# Patient Record
Sex: MALE | Race: WHITE | NOT HISPANIC OR LATINO | URBAN - METROPOLITAN AREA
[De-identification: names, ages, dates, MRNs, and addresses within clinical notes are randomized per-mention and may not be internally consistent; named-entity substitution may affect disease eponyms.]

---

## 2019-06-27 ENCOUNTER — HOSPITAL ENCOUNTER (OUTPATIENT)
Dept: RADIOLOGY | Facility: MEDICAL CENTER | Age: 64
End: 2019-06-27

## 2019-07-01 ENCOUNTER — HOSPITAL ENCOUNTER (OUTPATIENT)
Dept: RADIATION ONCOLOGY | Facility: MEDICAL CENTER | Age: 64
End: 2019-07-31
Attending: RADIOLOGY
Payer: COMMERCIAL

## 2019-07-01 VITALS
HEIGHT: 71 IN | DIASTOLIC BLOOD PRESSURE: 78 MMHG | OXYGEN SATURATION: 97 % | HEART RATE: 71 BPM | SYSTOLIC BLOOD PRESSURE: 122 MMHG | BODY MASS INDEX: 22.68 KG/M2 | WEIGHT: 162 LBS

## 2019-07-01 PROCEDURE — 99205 OFFICE O/P NEW HI 60 MIN: CPT | Performed by: RADIOLOGY

## 2019-07-01 PROCEDURE — 99214 OFFICE O/P EST MOD 30 MIN: CPT | Performed by: RADIOLOGY

## 2019-07-01 RX ORDER — TAMSULOSIN HYDROCHLORIDE 0.4 MG/1
CAPSULE ORAL
Refills: 1 | COMMUNITY
Start: 2019-05-08 | End: 2022-09-19

## 2019-07-01 RX ORDER — ASPIRIN 81 MG/1
81 TABLET, CHEWABLE ORAL DAILY
COMMUNITY
End: 2022-09-19

## 2019-07-01 ASSESSMENT — PAIN SCALES - GENERAL: PAINLEVEL: NO PAIN

## 2019-07-01 NOTE — CONSULTS
RADIATION ONCOLOGY CONSULT    DATE OF SERVICE: 7/1/2019    IDENTIFICATION:   Very high risk adenocarcinoma of the prostate, PSA 12.03, [place] score 5+4 = 9, total of 5 of 12 core biopsies involved, 1 core biopsy with [place] 5+4 = 9 1 core biopsy with [place] 4+3 equal 7 1 core biopsy with [place] 3+4 equal 7 and 2 core biopsies with Raymond 3+3 equal 6 clinical T1c    HISTORY OF PRESENT ILLNESS: I had the pleasure of seeing Mr. Major today in consultation at the request of Dr. Acevedo for his prostate cancer.  Patient is an otherwise healthy 63-year-old gentleman who had normal PSAs in the 3-4 range.  He states there had been a gap of at least an additional year from his most recent PSA.  When his PSA was checked it had risen substantially from his baseline to 10.12 and on recheck had stayed elevated at 12.03.  Transrectal ultrasound with biopsy was recommended.  Before the biopsy no palpable areas of nodularity were identified.  Biopsy confirmed 5 total areas of 12 with malignancy.  The highest Raymond area in the left mid being [place] 4+5 = 9.  The left apex had [place] 3+4 equal 7 in the left base had Raymond 4+3 equal 7.  The right mid lateral and right base lateral had Raymond 3+3 equal 6 disease.  Patient presents today discussed his options for high risk adenocarcinoma the prostate.  He has an upcoming appointment in [place] with urologic surgery.    PAST MEDICAL HISTORY:   Past Medical History:   Diagnosis Date   • Cancer (HCC)    • Diverticulosis    • Elevated PSA    • History of adenomatous polyp of colon    • Prostate cancer (HCC)    • Shoulder pain        PAST SURGICAL HISTORY:  History reviewed. No pertinent surgical history.    CURRENT MEDICATIONS:  Current Outpatient Prescriptions   Medication Sig Dispense Refill   • tamsulosin (FLOMAX) 0.4 MG capsule   1   • aspirin (ASA) 81 MG Chew Tab chewable tablet Take 81 mg by mouth every day.       No current facility-administered medications for this  "encounter.        ALLERGIES:    Patient has no known allergies.    FAMILY HISTORY:    History reviewed. No pertinent family history.    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  All are negative with relationship to this diagnosis with the exception of:  Patient has an IPSS score of 1-0, his score of 1 comes from a single trip at nighttime for nocturia, he does not have any erectile dysfunction, he has normal bowel habits, he denies any bony pain or neurologic symptoms    PHYSICAL EXAM:    Vitals:    07/01/19 1410   BP: 122/78   Pulse: 71   SpO2: 97%   Weight: 73.5 kg (162 lb)   Height: 1.803 m (5' 11\")   Pain Score: No pain      0= Fully active, able to carry on all pre-disease performance without restriction.    PAIN:  0  GENERAL: No apparent distress.  HEENT:  Pupils are equal, round, and reactive to light.  Extraocular muscles   are intact. Sclerae nonicteric.  Conjunctivae pink.  Oral cavity, tongue   protrudes midline.   NECK:  Supple without evidence of thyromegaly.  NODES:  No peripheral adenopathy of the neck, supraclavicular fossa or axillae   bilaterally.  LUNGS:  Clear to ascultation bilaterally   HEART:  Regular rate and rhythm.  No murmur appreciated  ABDOMEN:  Soft. No evidence of hepatosplenomegaly.  Positive bowel sounds.  RECTAL: No suspicious nodules or lesions  EXTREMITIES:  Without Edema.  NEUROLOGIC:  Cranial nerves II through XII were intact. Normal stance and gait motor and sensory grossly within normal limits    IMPRESSION:   Very high risk adenocarcinoma of the prostate, PSA 12.03, [place] score 5+4 = " 9, total of 5 of 12 core biopsies involved, 1 core biopsy with Raymond 5+4 = 9 1 core biopsy with Raymond 4+3 equal 7 1 core biopsy with Raymond 3+4 equal 7 and 2 core biopsies with Raymond 3+3 equal 6 clinical T1c     RECOMMENDATIONS:   I discussed the diagnosis, prognosis, and treatment options over a 1 hr 5 min time period, 95% of that time dedicated to ongoing treatment management.  I discussed and distinguish for the patient the different risk stratification profiles for prostate cancer.  With [place] 9 disease although only in 1 specimen and PSA over 10 he qualifies is very high risk.  Therefore we went over the NCCN guidelines for suitable management strategies.  We discussed combination androgen deprivation therapy for at least 18 months in combination with external beam radiotherapy or external beam radiotherapy and brachytherapy seed implantation.  We discussed the emerging use of surgery in combination with adjuvant radiotherapy and androgen deprivation therapy.  Currently we do not have category 1 status for this approach but is a national trend and management.  We went over the rationale for each of these approaches.  We went over the philosophies, oncologic principles, and rationales and compared this with side effects and expectations.  Patient has his appointment in [place] next week to further discuss the surgical component of management.  After that visit he will contact us with his final treatment approach.  If he wishes to pursue seed implantation as a part of his combined therapy I will have him schedule appointment with Dr. Quiroz to further discuss this component of the management.    We discussed the risks, benefits and side effects of treatment and the patient is amenable to treatment.  If patient has any questions or concerns, he should feel free to contact me.    Thank you for the opportunity to participate in his care.  If any questions or comments, please do not hesitate in  calling.

## 2019-07-01 NOTE — NON-PROVIDER
"Patient was seen today in clinic with Dr. Smith for Follow up.  Vitals signs and weight were obtained and pain assessment was completed.  Allergies and medications were reviewed with the patient.  Review of systems completed.     Vitals/Pain:  Vitals:    07/01/19 1410   BP: 122/78   Pulse: 71   SpO2: 97%   Weight: 73.5 kg (162 lb)   Height: 1.803 m (5' 11\")   Pain Score: No pain        Allergies:   Patient has no known allergies.    Current Medications:  Current Outpatient Prescriptions   Medication Sig Dispense Refill   • tamsulosin (FLOMAX) 0.4 MG capsule   1   • aspirin (ASA) 81 MG Chew Tab chewable tablet Take 81 mg by mouth every day.       No current facility-administered medications for this encounter.          PCP:  Gurpreet Avalos, Med Ass't    "

## 2019-07-16 ENCOUNTER — PATIENT OUTREACH (OUTPATIENT)
Dept: OTHER | Facility: MEDICAL CENTER | Age: 64
End: 2019-07-16

## 2019-07-16 NOTE — PROGRESS NOTES
Telephone call to pt to discuss navigation.  Introduced myself and the NN role and provided contact information.  Pt stated he has decided to [facility] [place] [facility] and will not be having treatment at [facility].

## 2021-03-03 DIAGNOSIS — Z23 NEED FOR VACCINATION: ICD-10-CM

## 2022-09-15 PROBLEM — C61 PROSTATE CANCER (HCC): Status: ACTIVE | Noted: 2022-09-15

## 2022-09-19 ENCOUNTER — HOSPITAL ENCOUNTER (OUTPATIENT)
Dept: RADIATION ONCOLOGY | Facility: MEDICAL CENTER | Age: 67
End: 2022-09-30
Attending: RADIOLOGY
Payer: COMMERCIAL

## 2022-09-19 VITALS
DIASTOLIC BLOOD PRESSURE: 77 MMHG | HEIGHT: 71 IN | HEART RATE: 66 BPM | OXYGEN SATURATION: 96 % | WEIGHT: 154 LBS | BODY MASS INDEX: 21.56 KG/M2 | SYSTOLIC BLOOD PRESSURE: 130 MMHG

## 2022-09-19 DIAGNOSIS — C61 PROSTATE CANCER (HCC): ICD-10-CM

## 2022-09-19 PROCEDURE — 99214 OFFICE O/P EST MOD 30 MIN: CPT | Performed by: RADIOLOGY

## 2022-09-19 PROCEDURE — 99205 OFFICE O/P NEW HI 60 MIN: CPT | Performed by: RADIOLOGY

## 2022-09-19 ASSESSMENT — PAIN SCALES - GENERAL: PAINLEVEL: NO PAIN

## 2022-09-19 NOTE — CT SIMULATION
PATIENT NAME Surya Major   PRIMARY PHYSICIAN Pcp Pt States None [id number]   REFERRING PHYSICIAN Curtis Helm M.D. 1955     Prostate cancer (HCC)  Staging form: Prostate, AJCC 8th Edition  - Pathologic: Stage IIB (pT2, pN0, cM0, PSA: 12, Grade Group: 2) - Signed by Jarret Smith M.D. on 9/15/2022  Prostate specific antigen (PSA) range: 10 to 19  Raymond score: 7  Histologic grading system: 5 grade system         Treatment Planning CT Simulation        Order Questions       Question Answer Comment    Is this for a new course of treatment? Yes     Is this an Addendum? No     Simulation Status Initial     Treatment Technique IMRT     Other Technique(s) IMRT     Treatment Pattern/Frequency Daily     Concurrent Chemotherapy No     CT Technique 3D     Slice Thickness 3mm     Scan Extent Abdomen      Pelvis     Bowel Preparation Yes     Treatment Device(s) Vac Rai     Patient Attire Gown     Patient Position Supine     Patient Orientation Head First     Arm Position On Chest     Bladder Full     Treatment Machine TB2 or TB3     Treatment Image Guidance CBCT     Frequency (CBCT) Daily     Image Guidance Match PTV - Soft Tissue Prostate    Other Orders Weekly Physics Check                   Comments       Prostate bed with nodes

## 2022-09-19 NOTE — CONSULTS
RADIATION ONCOLOGY CONSULT    DATE OF SERVICE: 9/19/2022    IDENTIFICATION:   Adenocarcinoma the prostate, presenting PSA 12, post prostatectomy PSA 0.01, current PSA 0.15, Raymond score on final surgical specimen 3+4 equal 7, pathologic T2 with positive surgical margin, status post radical prostatectomy with lymph node dissection in August 2019.      HISTORY OF PRESENT ILLNESS: I had the pleasure of seeing Mr. Major today in consultation at the request of Dr. Acevedo for his rising PSA.  I originally saw the patient back in 2019 when he was making his initial treatment decisions.  Ultimately he elected to proceed with a radical prostatectomy and lymph node dissection.  This confirmed an adenocarcinoma the prostate with Raymond 3+4 equal 7 disease.  He did not have any seminal vesicle or extraprostatic extension but did have a positive surgical margin.  His lymph nodes were surgically negative.  He had a PSA rene undetectable at 0.01.  It had remained undetectable until recently where he has had a slow steady rise to 0.15.  He has been sent today to further discuss the role of radiation in the post prostatectomy setting.  He has regained all urinary and erectile control.  He does not have any areas of bony tenderness or deformity.    PAST MEDICAL HISTORY:   Past Medical History:   Diagnosis Date    Atony of bladder     Cancer (HCC)     Diverticulosis     Elevated PSA     History of adenomatous polyp of colon     Prostate cancer (HCC)     Shoulder pain        PAST SURGICAL HISTORY:  Past Surgical History:   Procedure Laterality Date    PROSTATECTOMY, RADICAL RETRO      2019       CURRENT MEDICATIONS:  Current Outpatient Medications   Medication Sig Dispense Refill    tamsulosin (FLOMAX) 0.4 MG capsule   1    aspirin (ASA) 81 MG Chew Tab chewable tablet Take 81 mg by mouth every day.       No current facility-administered medications for this encounter.       ALLERGIES:    Patient has no known allergies.    FAMILY  "HISTORY:    Family History   Problem Relation Age of Onset    Cancer Father         Colom and prostate       SOCIAL HISTORY:    Social History     Tobacco Use    Smoking status: Former    Smokeless tobacco: Never    Tobacco comments:     quit in 1990   Vaping Use    Vaping Use: Never used   Substance Use Topics    Alcohol use: Yes     Comment: 3 drinks per day    Drug use: No       Patient is working as a   .  Lives with: Spouse.    REVIEW OF SYSTEMS:  A complete review of systems was completed in patient's chart on 9/19/2022.  All are negative with relationship to this diagnosis with the exception of:  Patient has regained erectile ability, he has excellent urinary control but on occasion will have leakage if he \"waits too long\"    PHYSICAL EXAM:    Vitals:    09/19/22 0823   BP: 130/77   BP Location: Right arm   Patient Position: Sitting   BP Cuff Size: Adult   Pulse: 66   SpO2: 96%   Weight: 69.9 kg (154 lb)   Height: 1.803 m (5' 11\")   Pain Score: No pain      0= Fully active, able to carry on all pre-disease performance without restriction.      GENERAL: No apparent distress.  HEENT:  Pupils are equal, round, and reactive to light.  Extraocular muscles   are intact. Sclerae nonicteric.  Conjunctivae pink.  Oral cavity, tongue   protrudes midline.   NECK:  Supple without evidence of thyromegaly.  NODES:  No peripheral adenopathy of the neck, supraclavicular fossa or axillae   bilaterally.  LUNGS:  Clear to ascultation bilaterally   HEART:  Regular rate and rhythm.  No murmur appreciated  ABDOMEN:  Soft. No evidence of hepatosplenomegaly.  Positive bowel sounds.  EXTREMITIES:  Without Edema.  NEUROLOGIC:  Cranial nerves II through XII were intact. Normal stance and gait motor and sensory grossly within normal limits       IPSS:  IN THE PAST MONTH:     1.  Incomplete Emptying: How often have you had the sensation of not emptying your bladder?  1 = Less that 1 in 5 times    2.  Frequency: How " often have you had to urinate less than every two hours? 0 = Not at all    3.  Intermittency: How often have you found you stopped and started again several times when you urinated? 0 = Not at all    4.  Urgency: How often have you found it difficult to postpone urination? 0 = Not at all    5.  Weak Stream: How often have you had a weak urinary stream? 0 = Not at all    6.  Straining: How often have you had to strain to start urination? 5 = Almost always    7.  Nocturia: How many times did you typically get up at night to urinate? 0 = None    TOTAL: 6 1-7 = Mild    QUALITY OF LIFE DUE TO URINARY SYMPTOMS:     If you were to spend the rest of your life with your urinary condition just the way it is now, how would you feel about that? 0 = Delighted      KRISTINA:  SEXUAL HEALTH INVENTORY FOR MEN (KRISTINA):   Over the past 6 months:   1.  How do you rate your confidence that you could get and keep an erection?  5 = Very High    2.  When you had erections with sexual stimulation, how often were your erections hard enough for penetration (entering your partner)? 5 = Almost always or always     3.  During sexual intercourse, how often were you able to maintain your erection after you had penetrated (entered) your partner? 0 = Did not attempt    4.  During sexual intercourse, how difficult was it to maintain your erection to completion of intercourse? 0= Did not attempt intercourse    5.  When you attempted sexual intercourse, how often was it satisfactory for you? 0 = Did not attempt intercourse    TOTAL: 10    The Sexual Health Inventory for Men further classifies ED severity with the following breakpoints: 8-11 = Moderate ED        IMPRESSION:    Adenocarcinoma the prostate, presenting PSA 12, post prostatectomy PSA 0.01, current PSA 0.15, [place] score on final surgical specimen 3+4 equal 7, pathologic T2 with positive surgical margin, status post radical prostatectomy with lymph node dissection in August 2019.           RECOMMENDATIONS:   I discussed the diagnosis, prognosis, and treatment options over a 1 hr 5 min time period, 95% of that time dedicated to ongoing treatment management.  Patient has had a very good handle of his presenting disease and current situation.  In addition he is well researched and understanding of the topics.  Therefore we discussed specifically the indicators for prostate bed, dorota, or distant recurrence patterns.  We went over some of the historical data with prostate bed only radiation.  Then we included more recent trials including the sporrt trial.  In this trial we get a little more insight into inclusion of pelvic field or androgen deprivation therapy.  We discussed the generic results pointing towards prostate bed, pelvic, and androgen deprivation therapy.  However given some of his features including positive margin, undetectable rene, slow rising post prostatectomy PSA with a low absolute volume of PSA consideration of need for androgen deprivation therapy or pelvic radiation deserves further discussion.  After long discussion on the topic ultimately he feels he would like to proceed with radiation at this PSA level.  He is leaning towards prostate bed and pelvic radiotherapy but omission of short course androgen deprivation therapy.  He does have some conflicts in the coming weeks and was given a simulation for October 10 at 11 AM.    We discussed the risks, benefits and side effects of treatment and the patient is amenable to treatment.  If patient has any questions or concerns, he should feel free to contact me.    Thank you for the opportunity to participate in his care.  If any questions or comments, please do not hesitate in calling.

## 2022-09-19 NOTE — PROGRESS NOTES
"Patient was seen today in clinic with Dr. Smith for consultation.  Vitals signs and weight were obtained and pain assessment was completed.  Allergies and medications were reviewed with the patient.      Vitals/Pain:  Vitals:    09/19/22 0823   BP: 130/77   BP Location: Right arm   Patient Position: Sitting   BP Cuff Size: Adult   Pulse: 66   SpO2: 96%   Weight: 69.9 kg (154 lb)   Height: 1.803 m (5' 11\")   Pain Score: No pain        Allergies:   Patient has no known allergies.    Current Medications:  Current Outpatient Medications   Medication Sig Dispense Refill    tamsulosin (FLOMAX) 0.4 MG capsule   1    aspirin (ASA) 81 MG Chew Tab chewable tablet Take 81 mg by mouth every day.       No current facility-administered medications for this encounter.         PCP:  Pcp Pt States None        Martha Mixon R.N.   "

## 2022-09-22 ENCOUNTER — PATIENT OUTREACH (OUTPATIENT)
Dept: ONCOLOGY | Facility: MEDICAL CENTER | Age: 67
End: 2022-09-22

## 2022-09-22 NOTE — PROGRESS NOTES
Call placed to patient, left message.  Sent letter and message via [facility].  Mailed letter and support service calendar.

## 2022-09-22 NOTE — LETTER
[facility]   [address]  [place] [address]  Phone: [phone] - Fax: [phone]              Surya Major  [address] [place] [address]     Date: 09/22/22      Dear Surya,    I am a Cancer Nurse Navigator, a certified oncology nurse. My role is to assess any needs you may have with education, guidance and support. I am available to you and your family through any cancer treatment at [facility].       I am available to address your needs during your journey with the following services:     Care Coordination  I can assist you in facilitating communication between your cancer care treatment team to ensure timely treatment and follow-up.  I can also assist with transition of care back to your primary care provider, or other specialist, as needed.  My goal is to bridge gaps for you throughout the course of your active treatment.       Education Services  Understanding the recommended treatment course by your physician is key. I can provide educational resources personalized to your cancer diagnosis to help you understand your diagnosis and treatment. Please let me know if you would like to receive information about your diagnosis and treatment plan.  I am here to help.     Support Services/Resource Information  [facility] we offer a full scope of support services.  I can assist you with referral information to:  · Cancer Clinical Trials & Research  · Nutrition counseling  · Support groups  · Complementary Therapies such as Mind-Body Techniques Meditation  · Patient Financial Advocates  ·   · [facility], an [employer] affiliate office, our volunteers can assist you with accessing our [employer] [employer]ing library, support services information, head coverings and comfort items  · Community and national resources, included eligibility based chase assistance and pharmaceutical access programs, if you are in need of additional  information.     [facility] offers services that include:  · Behavioral Health  · Genetic counseling & testing  · Acupuncture  · Lymphedema prevention/treatment program  · Palliative care services.       I hope you have an excellent patient experience.  Please feel free to share with me your comments regarding the care you have received- we value your feedback.    Sincerely,       Regina Echevarria R.N.  Cancer Nurse Navigator    Office: [phone]  Main:  [phone]   Email: [email or web address]

## 2022-10-03 ENCOUNTER — HOSPITAL ENCOUNTER (OUTPATIENT)
Dept: RADIATION ONCOLOGY | Facility: MEDICAL CENTER | Age: 67
End: 2022-10-31
Attending: RADIOLOGY
Payer: COMMERCIAL

## 2022-10-07 ENCOUNTER — PATIENT OUTREACH (OUTPATIENT)
Dept: ONCOLOGY | Facility: MEDICAL CENTER | Age: 67
End: 2022-10-07
Payer: COMMERCIAL

## 2022-10-07 NOTE — PROGRESS NOTES
On October 7, 2022, Oncology Social Worker Elizabeth Siu attempted telephone contact with pt.  OSW Salbador left voicemail message for pt. requesting pt. call back at earliest convenience.  OSW Salbador left contact information in voicemail message.

## 2022-10-10 ENCOUNTER — HOSPITAL ENCOUNTER (OUTPATIENT)
Dept: RADIATION ONCOLOGY | Facility: MEDICAL CENTER | Age: 67
End: 2022-10-10

## 2022-10-10 PROCEDURE — 77263 THER RADIOLOGY TX PLNG CPLX: CPT | Performed by: RADIOLOGY

## 2022-10-10 PROCEDURE — 77334 RADIATION TREATMENT AID(S): CPT | Performed by: RADIOLOGY

## 2022-10-10 PROCEDURE — 77334 RADIATION TREATMENT AID(S): CPT | Mod: 26 | Performed by: RADIOLOGY

## 2022-10-10 PROCEDURE — 77290 THER RAD SIMULAJ FIELD CPLX: CPT | Performed by: RADIOLOGY

## 2022-10-10 NOTE — RADIATION COMPLETION NOTES
DATE OF SERVICE: 10/10/2022    DIAGNOSIS:  Prostate cancer (HCC)  Staging form: Prostate, AJCC 8th Edition  - Pathologic: Stage IIB (pT2, pN0, cM0, PSA: 12, Grade Group: 2) - Signed by Jarret Smith M.D. on 9/15/2022  Prostate specific antigen (PSA) range: 10 to 19  [place] score: 7  Histologic grading system: 5 grade system       DATE OF SERVICE: 10/10/2022    TYPE OF SIMULATION: Pelvis    GOAL OF TREATMENT:   [x] Curative  [] Palliative  [] Oligometastatic    CONTRAST:    [] IV Contrast*  [] Small Bowel  [] Rectal  [] Urethral              POSITION:    [x]  Supine  [] Prone with belly board    COMPLEX:  [x] Complex Blocking   []Arcs  [] Custom Blocks  [] >3 Sites    PROCEDURE: Patient positioned on CT table in Vac-Rai immobilization device with or without belly board depending on position. CT acquired thorough the entire volume of interest.  Images reviewed and exported to treatment planning system.    I have personally reviewed the relevant data, performed the target localization, and determined all relevant factors for this patient’s simulation.    *Omnipaque 80 -100cc IVP in conjunction with 500cc NS

## 2022-10-10 NOTE — RADIATION COMPLETION NOTES
Clinical Treatment Planning Note    DATE OF SERVICE: 10/10/2022    DIAGNOSIS:  Prostate cancer (HCC)  Staging form: Prostate, AJCC 8th Edition  - Pathologic: Stage IIB (pT2, pN0, cM0, PSA: 12, Grade Group: 2) - Signed by Jarret Smith M.D. on 9/15/2022  Prostate specific antigen (PSA) range: 10 to 19  [place] score: 7  Histologic grading system: 5 grade system         IMAGING REVIEWED:  [x] CT     [] MRI     [] PET/CT     [] BONE SCAN     [] MAMMO     [] OTHER      TREATMENT INTENT:   [x] CURATIVE     [] MAINTENANCE     []  PALLIATIVE      []  SUPPORTIVE     []  PROPHYLACTIC     [] BENIGN     []  CONSOLIDATIVE      [] DEFINITIVE   []  OLOGIMETASTATIC      LINE OF TREATMENT:  [x] ADJUVANT   [] DEFINITIVE   [] NEOADJUVANT   [] RE-TREATMENT      TECHNIQUE PLANNED:  [x] IMRT   [] 3D   [] SBRT   [] SRS/SRT   [] HDR   [] ELECTRON       IMRT JUSTIFICATION:  []   An immediately adjacent area has been previously irradiated and abutting portals must be established with high precision.    []  Dose escalation is planned to deliver radiation doses in excess of those commonly utilized for similar tumors with conventional treatment.    []  The target volume is concave or convex, and the critical normal tissues are within or around that convexity or concavity.    [x]  The target volume is in close proximity to critical structures that must be protected.    [x]  The volume of interest must be covered with narrow margins to adequately protect  immediately adjacent structures.      FIELDS & BLOCKING:  [x] COMPLEX BLOCKS     []  = 3 TX AREAS     []  ARCS     []  CUSTOM SHEILD        []  SIMPLE BLOCK      CHEMOTHERAPY:  []  CONCURRENT     []  INDUCTION     [] SEQUENTIAL     []  <30 DAYS FROM XRT      NOTES:  OAR CONSTRAINTS: (GUIDELINES ONLY NOT ABSOLUTE)   Target Prescribed Coverage   PTVboost 100% of PTVboost covered by 95% (cGy) of RX Dose     PTVinitial 100% of PTVinitial covered by 95% (Gy) of RX Dose       HENNA Goal   Plan  Hot Spot Max Dose < 110%   Rectum V60Gy < 35%   Rectum V65Gy < 25%   Rectum V70Gy < 20%   Rectum V75Gy < 15%   Bladder V65Gy < 50%   Bladder V70Gy < 35%   Bladder V75Gy < 25%   Bladder V80Gy < 15%   R Femoral Head Max Dose < 50Gy   L Femoral Head Max Dose < 50Gy   individual Small Bowel Loops V15Gy < 120cc   Entire Cavity Small Bowel V45Gy < 195cc   Penile Bulb Mean Dose < 52.5Gy   *RTOG 0924, RTOG 1115, QUANTEC

## 2022-10-11 ENCOUNTER — PATIENT OUTREACH (OUTPATIENT)
Dept: ONCOLOGY | Facility: MEDICAL CENTER | Age: 67
End: 2022-10-11
Payer: COMMERCIAL

## 2022-10-11 NOTE — PROGRESS NOTES
"[facility] Intake    Diagnosis Type: Prostate   Preferred Language: English  Insurance: [facility] & [facility]   Dental Insurance:Yes; Last Appointment Date:\"about 4 months ago\"   Primary Care Provider Reviewed: yes  Last App[facility]ment Date: \"[facility]\"  Introduced Surya Major to [facility] and provided contact information on 10/11/2022 .  Current Barriers Identified Include: None at this time.  Xamplified Status:Activated, uses occasionally.   Communication Preferences: Xamplified messages; Best Contact Number: [phone]  Patient Contact's Reviewed: yes     Outbound call to pt. For [facility] intake. Pt. States he has family and [employer] as his support system. Educated pt. On [employer]/events web site, reviewed the cancer support groups calendar handout and the resource center. Provided pt. With MARY Tapia's and JUANIS Chin's contact information. Pt. States he has questions regarding medicare and would call [facility] soon, he declined a warm transfer to [facility] at this time.  Pt. Stated his wife has questions about \"cyberknife\" radiation treatment and he would send Dr. Smith a message in Xamplified.     Outcome:  No further needs at this time.          "

## 2022-10-11 NOTE — PROGRESS NOTES
Unable to meet with pt. Post SIM on 10/10/11, radiation therapist provided pt. with [facility] resource folder.

## 2022-10-14 ENCOUNTER — PATIENT OUTREACH (OUTPATIENT)
Dept: ONCOLOGY | Facility: MEDICAL CENTER | Age: 67
End: 2022-10-14
Payer: COMMERCIAL

## 2022-10-17 NOTE — PROGRESS NOTES
Spoke with Dr Smith regarding patient and wife's questions.  Information provided was patient is post surgery, so cyberknife 5 treatment is not indicated for this.  Call placed to patient and provided information discussed with Dr Smith.  Pt had wife on line as well.  Both reporting that answers their question and they understand.  Answered additional questions regarding wait time when getting treated and if additional x-ray/scans were needed, if this would  be a problem.  Pt and wife deny additional questions and report no barriers to care at this time.  Encouraged them to reach if anything comes up.

## 2022-10-18 ENCOUNTER — HOSPITAL ENCOUNTER (OUTPATIENT)
Dept: RADIATION ONCOLOGY | Facility: MEDICAL CENTER | Age: 67
End: 2022-10-18

## 2022-10-27 PROCEDURE — 77300 RADIATION THERAPY DOSE PLAN: CPT | Mod: 26 | Performed by: RADIOLOGY

## 2022-10-27 PROCEDURE — 77338 DESIGN MLC DEVICE FOR IMRT: CPT | Mod: 26 | Performed by: RADIOLOGY

## 2022-10-27 PROCEDURE — 77301 RADIOTHERAPY DOSE PLAN IMRT: CPT | Performed by: RADIOLOGY

## 2022-10-27 PROCEDURE — 77338 DESIGN MLC DEVICE FOR IMRT: CPT | Performed by: RADIOLOGY

## 2022-10-27 PROCEDURE — 77301 RADIOTHERAPY DOSE PLAN IMRT: CPT | Mod: 26 | Performed by: RADIOLOGY

## 2022-10-27 PROCEDURE — 77300 RADIATION THERAPY DOSE PLAN: CPT | Performed by: RADIOLOGY

## 2022-10-31 ENCOUNTER — HOSPITAL ENCOUNTER (OUTPATIENT)
Dept: RADIATION ONCOLOGY | Facility: MEDICAL CENTER | Age: 67
End: 2022-10-31

## 2022-10-31 LAB
CHEMOTHERAPY INFUSION START DATE: NORMAL
CHEMOTHERAPY RECORDS: 1.8
CHEMOTHERAPY RECORDS: 5040
CHEMOTHERAPY RECORDS: NORMAL
CHEMOTHERAPY RX CANCER: NORMAL
DATE 1ST CHEMO CANCER: NORMAL
RAD ONC ARIA COURSE LAST TREATMENT DATE: NORMAL
RAD ONC ARIA COURSE TREATMENT ELAPSED DAYS: NORMAL
RAD ONC ARIA REFERENCE POINT DOSAGE GIVEN TO DATE: 1.8
RAD ONC ARIA REFERENCE POINT DOSAGE GIVEN TO DATE: 1.85
RAD ONC ARIA REFERENCE POINT ID: NORMAL
RAD ONC ARIA REFERENCE POINT ID: NORMAL
RAD ONC ARIA REFERENCE POINT SESSION DOSAGE GIVEN: 1.8
RAD ONC ARIA REFERENCE POINT SESSION DOSAGE GIVEN: 1.85

## 2022-10-31 PROCEDURE — 77385 HCHG IMRT DELIVERY SIMPLE: CPT | Performed by: RADIOLOGY

## 2022-10-31 PROCEDURE — 77280 THER RAD SIMULAJ FIELD SMPL: CPT | Performed by: RADIOLOGY

## 2022-11-01 ENCOUNTER — HOSPITAL ENCOUNTER (OUTPATIENT)
Dept: RADIATION ONCOLOGY | Facility: MEDICAL CENTER | Age: 67
End: 2022-11-30
Attending: RADIOLOGY
Payer: COMMERCIAL

## 2022-11-01 ENCOUNTER — HOSPITAL ENCOUNTER (OUTPATIENT)
Dept: RADIATION ONCOLOGY | Facility: MEDICAL CENTER | Age: 67
End: 2022-11-01

## 2022-11-01 LAB
CHEMOTHERAPY INFUSION START DATE: NORMAL
CHEMOTHERAPY RECORDS: 1.8
CHEMOTHERAPY RECORDS: 5040
CHEMOTHERAPY RECORDS: NORMAL
CHEMOTHERAPY RX CANCER: NORMAL
DATE 1ST CHEMO CANCER: NORMAL
RAD ONC ARIA COURSE LAST TREATMENT DATE: NORMAL
RAD ONC ARIA COURSE TREATMENT ELAPSED DAYS: NORMAL
RAD ONC ARIA REFERENCE POINT DOSAGE GIVEN TO DATE: 3.6
RAD ONC ARIA REFERENCE POINT DOSAGE GIVEN TO DATE: 3.71
RAD ONC ARIA REFERENCE POINT ID: NORMAL
RAD ONC ARIA REFERENCE POINT ID: NORMAL
RAD ONC ARIA REFERENCE POINT SESSION DOSAGE GIVEN: 1.8
RAD ONC ARIA REFERENCE POINT SESSION DOSAGE GIVEN: 1.85

## 2022-11-01 PROCEDURE — 77385 HCHG IMRT DELIVERY SIMPLE: CPT | Performed by: RADIOLOGY

## 2022-11-01 PROCEDURE — 77014 PR CT GUIDANCE PLACEMENT RAD THERAPY FIELDS: CPT | Mod: 26 | Performed by: RADIOLOGY

## 2022-11-02 ENCOUNTER — HOSPITAL ENCOUNTER (OUTPATIENT)
Dept: RADIATION ONCOLOGY | Facility: MEDICAL CENTER | Age: 67
End: 2022-11-02
Payer: COMMERCIAL

## 2022-11-02 VITALS
HEART RATE: 62 BPM | WEIGHT: 155.87 LBS | OXYGEN SATURATION: 98 % | BODY MASS INDEX: 21.74 KG/M2 | DIASTOLIC BLOOD PRESSURE: 80 MMHG | SYSTOLIC BLOOD PRESSURE: 133 MMHG

## 2022-11-02 LAB
CHEMOTHERAPY INFUSION START DATE: NORMAL
CHEMOTHERAPY RECORDS: 1.8
CHEMOTHERAPY RECORDS: 5040
CHEMOTHERAPY RECORDS: NORMAL
CHEMOTHERAPY RX CANCER: NORMAL
DATE 1ST CHEMO CANCER: NORMAL
RAD ONC ARIA COURSE LAST TREATMENT DATE: NORMAL
RAD ONC ARIA COURSE TREATMENT ELAPSED DAYS: NORMAL
RAD ONC ARIA REFERENCE POINT DOSAGE GIVEN TO DATE: 5.4
RAD ONC ARIA REFERENCE POINT DOSAGE GIVEN TO DATE: 5.56
RAD ONC ARIA REFERENCE POINT ID: NORMAL
RAD ONC ARIA REFERENCE POINT ID: NORMAL
RAD ONC ARIA REFERENCE POINT SESSION DOSAGE GIVEN: 1.8
RAD ONC ARIA REFERENCE POINT SESSION DOSAGE GIVEN: 1.85

## 2022-11-02 PROCEDURE — 77014 PR CT GUIDANCE PLACEMENT RAD THERAPY FIELDS: CPT | Mod: 26 | Performed by: RADIOLOGY

## 2022-11-02 PROCEDURE — 77385 HCHG IMRT DELIVERY SIMPLE: CPT | Performed by: RADIOLOGY

## 2022-11-02 PROCEDURE — 77336 RADIATION PHYSICS CONSULT: CPT | Performed by: RADIOLOGY

## 2022-11-02 ASSESSMENT — PAIN SCALES - GENERAL: PAINLEVEL: NO PAIN

## 2022-11-03 ENCOUNTER — HOSPITAL ENCOUNTER (OUTPATIENT)
Dept: RADIATION ONCOLOGY | Facility: MEDICAL CENTER | Age: 67
End: 2022-11-03
Payer: COMMERCIAL

## 2022-11-03 LAB
CHEMOTHERAPY INFUSION START DATE: NORMAL
CHEMOTHERAPY RECORDS: 1.8
CHEMOTHERAPY RECORDS: 5040
CHEMOTHERAPY RECORDS: NORMAL
CHEMOTHERAPY RX CANCER: NORMAL
DATE 1ST CHEMO CANCER: NORMAL
RAD ONC ARIA COURSE LAST TREATMENT DATE: NORMAL
RAD ONC ARIA COURSE TREATMENT ELAPSED DAYS: NORMAL
RAD ONC ARIA REFERENCE POINT DOSAGE GIVEN TO DATE: 7.2
RAD ONC ARIA REFERENCE POINT DOSAGE GIVEN TO DATE: 7.41
RAD ONC ARIA REFERENCE POINT ID: NORMAL
RAD ONC ARIA REFERENCE POINT ID: NORMAL
RAD ONC ARIA REFERENCE POINT SESSION DOSAGE GIVEN: 1.8
RAD ONC ARIA REFERENCE POINT SESSION DOSAGE GIVEN: 1.85

## 2022-11-03 PROCEDURE — 77385 HCHG IMRT DELIVERY SIMPLE: CPT | Performed by: RADIOLOGY

## 2022-11-03 PROCEDURE — 77014 PR CT GUIDANCE PLACEMENT RAD THERAPY FIELDS: CPT | Mod: 26 | Performed by: RADIOLOGY

## 2022-11-04 ENCOUNTER — HOSPITAL ENCOUNTER (OUTPATIENT)
Dept: RADIATION ONCOLOGY | Facility: MEDICAL CENTER | Age: 67
End: 2022-11-04
Payer: COMMERCIAL

## 2022-11-04 LAB
CHEMOTHERAPY INFUSION START DATE: NORMAL
CHEMOTHERAPY RECORDS: 1.8
CHEMOTHERAPY RECORDS: 5040
CHEMOTHERAPY RECORDS: NORMAL
CHEMOTHERAPY RX CANCER: NORMAL
DATE 1ST CHEMO CANCER: NORMAL
RAD ONC ARIA COURSE LAST TREATMENT DATE: NORMAL
RAD ONC ARIA COURSE TREATMENT ELAPSED DAYS: NORMAL
RAD ONC ARIA REFERENCE POINT DOSAGE GIVEN TO DATE: 9
RAD ONC ARIA REFERENCE POINT DOSAGE GIVEN TO DATE: 9.26
RAD ONC ARIA REFERENCE POINT ID: NORMAL
RAD ONC ARIA REFERENCE POINT ID: NORMAL
RAD ONC ARIA REFERENCE POINT SESSION DOSAGE GIVEN: 1.8
RAD ONC ARIA REFERENCE POINT SESSION DOSAGE GIVEN: 1.85

## 2022-11-04 PROCEDURE — 77427 RADIATION TX MANAGEMENT X5: CPT | Performed by: RADIOLOGY

## 2022-11-04 PROCEDURE — 77014 PR CT GUIDANCE PLACEMENT RAD THERAPY FIELDS: CPT | Mod: 26 | Performed by: RADIOLOGY

## 2022-11-04 PROCEDURE — 77385 HCHG IMRT DELIVERY SIMPLE: CPT | Performed by: RADIOLOGY

## 2022-11-07 ENCOUNTER — HOSPITAL ENCOUNTER (OUTPATIENT)
Dept: RADIATION ONCOLOGY | Facility: MEDICAL CENTER | Age: 67
End: 2022-11-07
Payer: COMMERCIAL

## 2022-11-07 LAB
CHEMOTHERAPY INFUSION START DATE: NORMAL
CHEMOTHERAPY RECORDS: 1.8
CHEMOTHERAPY RECORDS: 5040
CHEMOTHERAPY RECORDS: NORMAL
CHEMOTHERAPY RX CANCER: NORMAL
DATE 1ST CHEMO CANCER: NORMAL
RAD ONC ARIA COURSE LAST TREATMENT DATE: NORMAL
RAD ONC ARIA COURSE TREATMENT ELAPSED DAYS: NORMAL
RAD ONC ARIA REFERENCE POINT DOSAGE GIVEN TO DATE: 10.8
RAD ONC ARIA REFERENCE POINT DOSAGE GIVEN TO DATE: 11.12
RAD ONC ARIA REFERENCE POINT ID: NORMAL
RAD ONC ARIA REFERENCE POINT ID: NORMAL
RAD ONC ARIA REFERENCE POINT SESSION DOSAGE GIVEN: 1.8
RAD ONC ARIA REFERENCE POINT SESSION DOSAGE GIVEN: 1.85

## 2022-11-07 PROCEDURE — 77385 HCHG IMRT DELIVERY SIMPLE: CPT | Performed by: RADIOLOGY

## 2022-11-07 PROCEDURE — 77014 PR CT GUIDANCE PLACEMENT RAD THERAPY FIELDS: CPT | Mod: 26 | Performed by: RADIOLOGY

## 2022-11-08 ENCOUNTER — HOSPITAL ENCOUNTER (OUTPATIENT)
Dept: RADIATION ONCOLOGY | Facility: MEDICAL CENTER | Age: 67
End: 2022-11-08
Payer: COMMERCIAL

## 2022-11-08 LAB
CHEMOTHERAPY INFUSION START DATE: NORMAL
CHEMOTHERAPY RECORDS: 1.8
CHEMOTHERAPY RECORDS: 5040
CHEMOTHERAPY RECORDS: NORMAL
CHEMOTHERAPY RX CANCER: NORMAL
DATE 1ST CHEMO CANCER: NORMAL
RAD ONC ARIA COURSE LAST TREATMENT DATE: NORMAL
RAD ONC ARIA COURSE TREATMENT ELAPSED DAYS: NORMAL
RAD ONC ARIA REFERENCE POINT DOSAGE GIVEN TO DATE: 12.6
RAD ONC ARIA REFERENCE POINT DOSAGE GIVEN TO DATE: 12.97
RAD ONC ARIA REFERENCE POINT ID: NORMAL
RAD ONC ARIA REFERENCE POINT ID: NORMAL
RAD ONC ARIA REFERENCE POINT SESSION DOSAGE GIVEN: 1.8
RAD ONC ARIA REFERENCE POINT SESSION DOSAGE GIVEN: 1.85

## 2022-11-08 PROCEDURE — 77014 PR CT GUIDANCE PLACEMENT RAD THERAPY FIELDS: CPT | Mod: 26 | Performed by: RADIOLOGY

## 2022-11-08 PROCEDURE — 77385 HCHG IMRT DELIVERY SIMPLE: CPT | Performed by: RADIOLOGY

## 2022-11-09 ENCOUNTER — HOSPITAL ENCOUNTER (OUTPATIENT)
Dept: RADIATION ONCOLOGY | Facility: MEDICAL CENTER | Age: 67
End: 2022-11-09
Payer: COMMERCIAL

## 2022-11-09 VITALS — SYSTOLIC BLOOD PRESSURE: 130 MMHG | DIASTOLIC BLOOD PRESSURE: 79 MMHG | HEART RATE: 70 BPM | OXYGEN SATURATION: 96 %

## 2022-11-09 LAB
CHEMOTHERAPY INFUSION START DATE: NORMAL
CHEMOTHERAPY RECORDS: 1.8
CHEMOTHERAPY RECORDS: 5040
CHEMOTHERAPY RECORDS: NORMAL
CHEMOTHERAPY RX CANCER: NORMAL
DATE 1ST CHEMO CANCER: NORMAL
RAD ONC ARIA COURSE LAST TREATMENT DATE: NORMAL
RAD ONC ARIA COURSE TREATMENT ELAPSED DAYS: NORMAL
RAD ONC ARIA REFERENCE POINT DOSAGE GIVEN TO DATE: 14.4
RAD ONC ARIA REFERENCE POINT DOSAGE GIVEN TO DATE: 14.82
RAD ONC ARIA REFERENCE POINT ID: NORMAL
RAD ONC ARIA REFERENCE POINT ID: NORMAL
RAD ONC ARIA REFERENCE POINT SESSION DOSAGE GIVEN: 1.8
RAD ONC ARIA REFERENCE POINT SESSION DOSAGE GIVEN: 1.85

## 2022-11-09 PROCEDURE — 77014 PR CT GUIDANCE PLACEMENT RAD THERAPY FIELDS: CPT | Mod: 26 | Performed by: RADIOLOGY

## 2022-11-09 PROCEDURE — 77385 HCHG IMRT DELIVERY SIMPLE: CPT | Performed by: RADIOLOGY

## 2022-11-09 PROCEDURE — 77336 RADIATION PHYSICS CONSULT: CPT | Performed by: RADIOLOGY

## 2022-11-09 ASSESSMENT — PAIN SCALES - GENERAL: PAINLEVEL: NO PAIN

## 2022-11-09 NOTE — ON TREATMENT VISIT
ON TREATMENT NOTE  RADIATION ONCOLOGY DEPARTMENT    Patient name:  Surya Major    Primary Physician:  Pcp Pt States None MRN: [id number]  CSN: [id number]   Referring physician:  No ref. provider found : 1955, 66 y.o.     ENCOUNTER DATE:  22    DIAGNOSIS:    Prostate cancer (HCC)  Staging form: Prostate, AJCC 8th Edition  - Pathologic: Stage IIB (pT2, pN0, cM0, PSA: 12, Grade Group: 2) - Signed by Jarret Smith M.D. on 9/15/2022  Prostate specific antigen (PSA) range: 10 to 19  [place] score: 7  Histologic grading system: 5 grade system      TREATMENT SUMMARY:  [facility] Treatment Information          2022    10:31   [facility] Treatment Summary   Course First Treatment Date 10/31/2022     Course Last Treatment Date 2022        Radiation Treatments       Active   Plans   Prostate   Most recent treatment: Dose planned: 180 cGy (fraction 8 of 28 on 2022)   Total: Dose planned: 5,040 cGy   Elapsed Days: 9 @            Historical   No historical radiation treatments to show.               SUBJECTIVE:   Patient is doing well.  He did have 1 episode of increased urgency or frequency of urination.  This has resolved.  He also has a slight feeling of queasiness but no sorin nausea.    VITAL SIGNS:    Encounter Vitals  Blood Pressure : 130/79  Pulse: 70  Pulse Oximetry: 96 %  Pain Score: No pain      2022     9:54 AM 2022    11:24 AM 2022     8:23 AM   Pain Assessment   Pain Score NO PAIN NO PAIN NO PAIN          PHYSICAL EXAM:  Physical Exam  Genitourinary:     Comments: Normal       TOXICITY      2022     9:56 AM 2022    11:25 AM   Toxicity Assessment   Toxicity Assessment Male Pelvis Male Pelvis   Fatigue (lethargy, malaise, asthenia) Increased fatigue over baseline, but not altering normal activities None   Radiation Dermatitis None None   Anorexia None None   Colitis None None   Constipation None None   Dehydration None None   Diarrhea w/o Colostomy None  None   Flatulence Mild None   Nausea None None   Proctitis None None   Vomiting None None   RT - Pain due to RT None None   Tumor Pain (onset or exacerbation of tumor pain due to treatment) None None   Dysuria (painful urination) Mild symptoms requiring no intervention None   Urinary Frequency Normal Normal   Urinary Urgency None None   Bladder Spasms Absent Absent   Incontinence None None   Urinary Retention Normal Normal         IMPRESSION:  Cancer Staging   Prostate cancer (HCC)  Staging form: Prostate, AJCC 8th Edition  - Clinical: No stage assigned - Unsigned  - Pathologic: Stage IIB (pT2, pN0, cM0, PSA: 12, Grade Group: 2) - Signed by Jarret Smith M.D. on 9/15/2022      PLAN:  No change in treatment plan    Disposition:  Treatment plan reviewed. Questions answered. Continue therapy outlined.     Jarret Smith M.D.    No orders of the defined types were placed in this encounter.

## 2022-11-10 ENCOUNTER — HOSPITAL ENCOUNTER (OUTPATIENT)
Dept: RADIATION ONCOLOGY | Facility: MEDICAL CENTER | Age: 67
End: 2022-11-10
Payer: COMMERCIAL

## 2022-11-10 LAB
CHEMOTHERAPY INFUSION START DATE: NORMAL
CHEMOTHERAPY RECORDS: 1.8
CHEMOTHERAPY RECORDS: 5040
CHEMOTHERAPY RECORDS: NORMAL
CHEMOTHERAPY RX CANCER: NORMAL
DATE 1ST CHEMO CANCER: NORMAL
RAD ONC ARIA COURSE LAST TREATMENT DATE: NORMAL
RAD ONC ARIA COURSE TREATMENT ELAPSED DAYS: NORMAL
RAD ONC ARIA REFERENCE POINT DOSAGE GIVEN TO DATE: 16.2
RAD ONC ARIA REFERENCE POINT DOSAGE GIVEN TO DATE: 16.67
RAD ONC ARIA REFERENCE POINT ID: NORMAL
RAD ONC ARIA REFERENCE POINT ID: NORMAL
RAD ONC ARIA REFERENCE POINT SESSION DOSAGE GIVEN: 1.8
RAD ONC ARIA REFERENCE POINT SESSION DOSAGE GIVEN: 1.85

## 2022-11-10 PROCEDURE — 77014 PR CT GUIDANCE PLACEMENT RAD THERAPY FIELDS: CPT | Mod: 26 | Performed by: RADIOLOGY

## 2022-11-10 PROCEDURE — 77385 HCHG IMRT DELIVERY SIMPLE: CPT | Performed by: RADIOLOGY

## 2022-11-11 ENCOUNTER — HOSPITAL ENCOUNTER (OUTPATIENT)
Dept: RADIATION ONCOLOGY | Facility: MEDICAL CENTER | Age: 67
End: 2022-11-11
Payer: COMMERCIAL

## 2022-11-11 LAB
CHEMOTHERAPY INFUSION START DATE: NORMAL
CHEMOTHERAPY RECORDS: 1.8
CHEMOTHERAPY RECORDS: 5040
CHEMOTHERAPY RECORDS: NORMAL
CHEMOTHERAPY RX CANCER: NORMAL
DATE 1ST CHEMO CANCER: NORMAL
RAD ONC ARIA COURSE LAST TREATMENT DATE: NORMAL
RAD ONC ARIA COURSE TREATMENT ELAPSED DAYS: NORMAL
RAD ONC ARIA REFERENCE POINT DOSAGE GIVEN TO DATE: 18
RAD ONC ARIA REFERENCE POINT DOSAGE GIVEN TO DATE: 18.53
RAD ONC ARIA REFERENCE POINT ID: NORMAL
RAD ONC ARIA REFERENCE POINT ID: NORMAL
RAD ONC ARIA REFERENCE POINT SESSION DOSAGE GIVEN: 1.8
RAD ONC ARIA REFERENCE POINT SESSION DOSAGE GIVEN: 1.85

## 2022-11-11 PROCEDURE — 77385 HCHG IMRT DELIVERY SIMPLE: CPT | Performed by: RADIOLOGY

## 2022-11-11 PROCEDURE — 77427 RADIATION TX MANAGEMENT X5: CPT | Performed by: RADIOLOGY

## 2022-11-11 PROCEDURE — 77014 PR CT GUIDANCE PLACEMENT RAD THERAPY FIELDS: CPT | Mod: 26 | Performed by: RADIOLOGY

## 2022-11-14 ENCOUNTER — HOSPITAL ENCOUNTER (OUTPATIENT)
Dept: RADIATION ONCOLOGY | Facility: MEDICAL CENTER | Age: 67
End: 2022-11-14
Payer: COMMERCIAL

## 2022-11-14 LAB
CHEMOTHERAPY INFUSION START DATE: NORMAL
CHEMOTHERAPY RECORDS: 1.8
CHEMOTHERAPY RECORDS: 5040
CHEMOTHERAPY RECORDS: NORMAL
CHEMOTHERAPY RX CANCER: NORMAL
DATE 1ST CHEMO CANCER: NORMAL
RAD ONC ARIA COURSE LAST TREATMENT DATE: NORMAL
RAD ONC ARIA COURSE TREATMENT ELAPSED DAYS: NORMAL
RAD ONC ARIA REFERENCE POINT DOSAGE GIVEN TO DATE: 19.8
RAD ONC ARIA REFERENCE POINT DOSAGE GIVEN TO DATE: 20.38
RAD ONC ARIA REFERENCE POINT ID: NORMAL
RAD ONC ARIA REFERENCE POINT ID: NORMAL
RAD ONC ARIA REFERENCE POINT SESSION DOSAGE GIVEN: 1.8
RAD ONC ARIA REFERENCE POINT SESSION DOSAGE GIVEN: 1.85

## 2022-11-14 PROCEDURE — 77385 HCHG IMRT DELIVERY SIMPLE: CPT | Performed by: RADIOLOGY

## 2022-11-14 PROCEDURE — 77014 PR CT GUIDANCE PLACEMENT RAD THERAPY FIELDS: CPT | Mod: 26 | Performed by: RADIOLOGY

## 2022-11-15 ENCOUNTER — HOSPITAL ENCOUNTER (OUTPATIENT)
Dept: RADIATION ONCOLOGY | Facility: MEDICAL CENTER | Age: 67
End: 2022-11-15
Payer: COMMERCIAL

## 2022-11-15 LAB
CHEMOTHERAPY INFUSION START DATE: NORMAL
CHEMOTHERAPY RECORDS: 1.8
CHEMOTHERAPY RECORDS: 5040
CHEMOTHERAPY RECORDS: NORMAL
CHEMOTHERAPY RX CANCER: NORMAL
DATE 1ST CHEMO CANCER: NORMAL
RAD ONC ARIA COURSE LAST TREATMENT DATE: NORMAL
RAD ONC ARIA COURSE TREATMENT ELAPSED DAYS: NORMAL
RAD ONC ARIA REFERENCE POINT DOSAGE GIVEN TO DATE: 21.6
RAD ONC ARIA REFERENCE POINT DOSAGE GIVEN TO DATE: 22.23
RAD ONC ARIA REFERENCE POINT ID: NORMAL
RAD ONC ARIA REFERENCE POINT ID: NORMAL
RAD ONC ARIA REFERENCE POINT SESSION DOSAGE GIVEN: 1.8
RAD ONC ARIA REFERENCE POINT SESSION DOSAGE GIVEN: 1.85

## 2022-11-15 PROCEDURE — 77385 HCHG IMRT DELIVERY SIMPLE: CPT | Performed by: RADIOLOGY

## 2022-11-15 PROCEDURE — 77014 PR CT GUIDANCE PLACEMENT RAD THERAPY FIELDS: CPT | Mod: 26 | Performed by: RADIOLOGY

## 2022-11-16 ENCOUNTER — HOSPITAL ENCOUNTER (OUTPATIENT)
Dept: RADIATION ONCOLOGY | Facility: MEDICAL CENTER | Age: 67
End: 2022-11-16
Payer: COMMERCIAL

## 2022-11-16 VITALS
BODY MASS INDEX: 21.52 KG/M2 | OXYGEN SATURATION: 97 % | SYSTOLIC BLOOD PRESSURE: 139 MMHG | WEIGHT: 154.32 LBS | DIASTOLIC BLOOD PRESSURE: 78 MMHG | HEART RATE: 65 BPM

## 2022-11-16 LAB
CHEMOTHERAPY INFUSION START DATE: NORMAL
CHEMOTHERAPY RECORDS: 1.8
CHEMOTHERAPY RECORDS: 5040
CHEMOTHERAPY RECORDS: NORMAL
CHEMOTHERAPY RX CANCER: NORMAL
DATE 1ST CHEMO CANCER: NORMAL
RAD ONC ARIA COURSE LAST TREATMENT DATE: NORMAL
RAD ONC ARIA COURSE TREATMENT ELAPSED DAYS: NORMAL
RAD ONC ARIA REFERENCE POINT DOSAGE GIVEN TO DATE: 23.4
RAD ONC ARIA REFERENCE POINT DOSAGE GIVEN TO DATE: 24.08
RAD ONC ARIA REFERENCE POINT ID: NORMAL
RAD ONC ARIA REFERENCE POINT ID: NORMAL
RAD ONC ARIA REFERENCE POINT SESSION DOSAGE GIVEN: 1.8
RAD ONC ARIA REFERENCE POINT SESSION DOSAGE GIVEN: 1.85

## 2022-11-16 PROCEDURE — 77385 HCHG IMRT DELIVERY SIMPLE: CPT | Performed by: RADIOLOGY

## 2022-11-16 PROCEDURE — 77336 RADIATION PHYSICS CONSULT: CPT | Performed by: RADIOLOGY

## 2022-11-16 PROCEDURE — 77014 PR CT GUIDANCE PLACEMENT RAD THERAPY FIELDS: CPT | Mod: 26 | Performed by: RADIOLOGY

## 2022-11-16 ASSESSMENT — PAIN SCALES - GENERAL: PAINLEVEL: NO PAIN

## 2022-11-16 NOTE — ON TREATMENT VISIT
ON TREATMENT NOTE  RADIATION ONCOLOGY DEPARTMENT    Patient name:  Surya Major    Primary Physician:  Pcp Pt States None MRN: [id number]  CSN: [id number]   Referring physician:  No ref. provider found : 1955, 67 y.o.     ENCOUNTER DATE:  22    DIAGNOSIS:    Prostate cancer (HCC)  Staging form: Prostate, AJCC 8th Edition  - Pathologic: Stage IIB (pT2, pN0, cM0, PSA: 12, Grade Group: 2) - Signed by Jarret Smith M.D. on 9/15/2022  Prostate specific antigen (PSA) range: 10 to 19  [place] score: 7  Histologic grading system: 5 grade system      TREATMENT SUMMARY:  [facility] Treatment Information          2022    10:21   [facility] Treatment Summary   Course First Treatment Date 10/31/2022     Course Last Treatment Date 2022        Radiation Treatments       Active   Plans   Prostate   Most recent treatment: Dose planned: 180 cGy (fraction 13 of 28 on 2022)   Total: Dose planned: 5,040 cGy   Elapsed Days: 16 @            Historical   No historical radiation treatments to show.               SUBJECTIVE:   Patient still has a slightly unsettled feeling in his bowels after radiation.  This does resolve over the weekend.  This is most likely relative to his pelvic field.  I discussed with the patient this will likely resolve when we come down to just the prostate bed.  He does not feel any intervention is required.    VITAL SIGNS:    Encounter Vitals  Blood Pressure : 139/78  Pulse: 65  Pulse Oximetry: 97 %  Weight: 70 kg (154 lb 5.2 oz)  Pain Score: No pain      2022    10:43 AM 2022     9:54 AM 2022    11:24 AM 2022     8:23 AM   Pain Assessment   Pain Score NO PAIN NO PAIN NO PAIN NO PAIN          PHYSICAL EXAM:  Physical Exam  Genitourinary:     Comments: Normal       TOXICITY      2022    10:45 AM 2022     9:56 AM 2022    11:25 AM   Toxicity Assessment   Toxicity Assessment Male Pelvis Male Pelvis Male Pelvis   Fatigue (lethargy, malaise,  asthenia) Increased fatigue over baseline, but not altering normal activities Increased fatigue over baseline, but not altering normal activities None   Radiation Dermatitis None None None   Anorexia Loss of appetite None None   Colitis None None None   Constipation None None None   Dehydration None None None   Diarrhea w/o Colostomy Increase of <4 stools/day over pre-treatment None None   Flatulence None Mild None   Nausea None None None   Proctitis None None None   Vomiting None None None   RT - Pain due to RT None None None   Tumor Pain (onset or exacerbation of tumor pain due to treatment) None None None   Dysuria (painful urination) None Mild symptoms requiring no intervention None   Urinary Frequency Normal Normal Normal   Urinary Urgency None None None   Bladder Spasms Absent Absent Absent   Incontinence None None None   Urinary Retention Normal Normal Normal         IMPRESSION:  Cancer Staging   Prostate cancer (HCC)  Staging form: Prostate, AJCC 8th Edition  - Clinical: No stage assigned - Unsigned  - Pathologic: Stage IIB (pT2, pN0, cM0, PSA: 12, Grade Group: 2) - Signed by Jarret Smith M.D. on 9/15/2022      PLAN:  No change in treatment plan    Disposition:  Treatment plan reviewed. Questions answered. Continue therapy outlined.     Jarret Smith M.D.    No orders of the defined types were placed in this encounter.

## 2022-11-17 ENCOUNTER — HOSPITAL ENCOUNTER (OUTPATIENT)
Dept: RADIATION ONCOLOGY | Facility: MEDICAL CENTER | Age: 67
End: 2022-11-17
Payer: COMMERCIAL

## 2022-11-17 LAB
CHEMOTHERAPY INFUSION START DATE: NORMAL
CHEMOTHERAPY RECORDS: 1.8
CHEMOTHERAPY RECORDS: 5040
CHEMOTHERAPY RECORDS: NORMAL
CHEMOTHERAPY RX CANCER: NORMAL
DATE 1ST CHEMO CANCER: NORMAL
RAD ONC ARIA COURSE LAST TREATMENT DATE: NORMAL
RAD ONC ARIA COURSE TREATMENT ELAPSED DAYS: NORMAL
RAD ONC ARIA REFERENCE POINT DOSAGE GIVEN TO DATE: 25.2
RAD ONC ARIA REFERENCE POINT DOSAGE GIVEN TO DATE: 25.94
RAD ONC ARIA REFERENCE POINT ID: NORMAL
RAD ONC ARIA REFERENCE POINT ID: NORMAL
RAD ONC ARIA REFERENCE POINT SESSION DOSAGE GIVEN: 1.8
RAD ONC ARIA REFERENCE POINT SESSION DOSAGE GIVEN: 1.85

## 2022-11-17 PROCEDURE — 77014 PR CT GUIDANCE PLACEMENT RAD THERAPY FIELDS: CPT | Mod: 26 | Performed by: RADIOLOGY

## 2022-11-17 PROCEDURE — 77385 HCHG IMRT DELIVERY SIMPLE: CPT | Performed by: RADIOLOGY

## 2022-11-18 ENCOUNTER — HOSPITAL ENCOUNTER (OUTPATIENT)
Dept: RADIATION ONCOLOGY | Facility: MEDICAL CENTER | Age: 67
End: 2022-11-18
Payer: COMMERCIAL

## 2022-11-18 LAB
CHEMOTHERAPY INFUSION START DATE: NORMAL
CHEMOTHERAPY RECORDS: 1.8
CHEMOTHERAPY RECORDS: 5040
CHEMOTHERAPY RECORDS: NORMAL
CHEMOTHERAPY RX CANCER: NORMAL
DATE 1ST CHEMO CANCER: NORMAL
RAD ONC ARIA COURSE LAST TREATMENT DATE: NORMAL
RAD ONC ARIA COURSE TREATMENT ELAPSED DAYS: NORMAL
RAD ONC ARIA REFERENCE POINT DOSAGE GIVEN TO DATE: 27
RAD ONC ARIA REFERENCE POINT DOSAGE GIVEN TO DATE: 27.79
RAD ONC ARIA REFERENCE POINT ID: NORMAL
RAD ONC ARIA REFERENCE POINT ID: NORMAL
RAD ONC ARIA REFERENCE POINT SESSION DOSAGE GIVEN: 1.8
RAD ONC ARIA REFERENCE POINT SESSION DOSAGE GIVEN: 1.85

## 2022-11-18 PROCEDURE — 77385 HCHG IMRT DELIVERY SIMPLE: CPT | Performed by: RADIOLOGY

## 2022-11-18 PROCEDURE — 77014 PR CT GUIDANCE PLACEMENT RAD THERAPY FIELDS: CPT | Mod: 26 | Performed by: RADIOLOGY

## 2022-11-18 PROCEDURE — 77427 RADIATION TX MANAGEMENT X5: CPT | Performed by: RADIOLOGY

## 2022-11-20 ENCOUNTER — HOSPITAL ENCOUNTER (OUTPATIENT)
Dept: RADIATION ONCOLOGY | Facility: MEDICAL CENTER | Age: 67
End: 2022-11-20
Payer: COMMERCIAL

## 2022-11-20 LAB
CHEMOTHERAPY INFUSION START DATE: NORMAL
CHEMOTHERAPY RECORDS: 1.8
CHEMOTHERAPY RECORDS: 5040
CHEMOTHERAPY RECORDS: NORMAL
CHEMOTHERAPY RX CANCER: NORMAL
DATE 1ST CHEMO CANCER: NORMAL
RAD ONC ARIA COURSE LAST TREATMENT DATE: NORMAL
RAD ONC ARIA COURSE TREATMENT ELAPSED DAYS: NORMAL
RAD ONC ARIA REFERENCE POINT DOSAGE GIVEN TO DATE: 28.8
RAD ONC ARIA REFERENCE POINT DOSAGE GIVEN TO DATE: 29.64
RAD ONC ARIA REFERENCE POINT ID: NORMAL
RAD ONC ARIA REFERENCE POINT ID: NORMAL
RAD ONC ARIA REFERENCE POINT SESSION DOSAGE GIVEN: 1.8
RAD ONC ARIA REFERENCE POINT SESSION DOSAGE GIVEN: 1.85

## 2022-11-20 PROCEDURE — 77014 PR CT GUIDANCE PLACEMENT RAD THERAPY FIELDS: CPT | Mod: 26 | Performed by: RADIOLOGY

## 2022-11-20 PROCEDURE — 77385 HCHG IMRT DELIVERY SIMPLE: CPT | Performed by: RADIOLOGY

## 2022-11-21 ENCOUNTER — HOSPITAL ENCOUNTER (OUTPATIENT)
Dept: RADIATION ONCOLOGY | Facility: MEDICAL CENTER | Age: 67
End: 2022-11-21
Payer: COMMERCIAL

## 2022-11-21 LAB
CHEMOTHERAPY INFUSION START DATE: NORMAL
CHEMOTHERAPY RECORDS: 1.8
CHEMOTHERAPY RECORDS: 5040
CHEMOTHERAPY RECORDS: NORMAL
CHEMOTHERAPY RX CANCER: NORMAL
DATE 1ST CHEMO CANCER: NORMAL
RAD ONC ARIA COURSE LAST TREATMENT DATE: NORMAL
RAD ONC ARIA COURSE TREATMENT ELAPSED DAYS: NORMAL
RAD ONC ARIA REFERENCE POINT DOSAGE GIVEN TO DATE: 30.6
RAD ONC ARIA REFERENCE POINT DOSAGE GIVEN TO DATE: 31.49
RAD ONC ARIA REFERENCE POINT ID: NORMAL
RAD ONC ARIA REFERENCE POINT ID: NORMAL
RAD ONC ARIA REFERENCE POINT SESSION DOSAGE GIVEN: 1.8
RAD ONC ARIA REFERENCE POINT SESSION DOSAGE GIVEN: 1.85

## 2022-11-21 PROCEDURE — 77385 HCHG IMRT DELIVERY SIMPLE: CPT | Performed by: RADIOLOGY

## 2022-11-21 PROCEDURE — 77014 PR CT GUIDANCE PLACEMENT RAD THERAPY FIELDS: CPT | Mod: 26 | Performed by: RADIOLOGY

## 2022-11-22 ENCOUNTER — HOSPITAL ENCOUNTER (OUTPATIENT)
Dept: RADIATION ONCOLOGY | Facility: MEDICAL CENTER | Age: 67
End: 2022-11-22
Payer: COMMERCIAL

## 2022-11-22 VITALS
HEART RATE: 71 BPM | BODY MASS INDEX: 21.55 KG/M2 | WEIGHT: 154.54 LBS | OXYGEN SATURATION: 97 % | SYSTOLIC BLOOD PRESSURE: 121 MMHG | DIASTOLIC BLOOD PRESSURE: 71 MMHG

## 2022-11-22 LAB
CHEMOTHERAPY INFUSION START DATE: NORMAL
CHEMOTHERAPY RECORDS: 1.8
CHEMOTHERAPY RECORDS: 5040
CHEMOTHERAPY RECORDS: NORMAL
CHEMOTHERAPY RX CANCER: NORMAL
DATE 1ST CHEMO CANCER: NORMAL
RAD ONC ARIA COURSE LAST TREATMENT DATE: NORMAL
RAD ONC ARIA COURSE TREATMENT ELAPSED DAYS: NORMAL
RAD ONC ARIA REFERENCE POINT DOSAGE GIVEN TO DATE: 32.4
RAD ONC ARIA REFERENCE POINT DOSAGE GIVEN TO DATE: 33.35
RAD ONC ARIA REFERENCE POINT ID: NORMAL
RAD ONC ARIA REFERENCE POINT ID: NORMAL
RAD ONC ARIA REFERENCE POINT SESSION DOSAGE GIVEN: 1.8
RAD ONC ARIA REFERENCE POINT SESSION DOSAGE GIVEN: 1.85

## 2022-11-22 PROCEDURE — 77385 HCHG IMRT DELIVERY SIMPLE: CPT | Performed by: RADIOLOGY

## 2022-11-22 PROCEDURE — 77336 RADIATION PHYSICS CONSULT: CPT | Performed by: RADIOLOGY

## 2022-11-22 PROCEDURE — 77014 PR CT GUIDANCE PLACEMENT RAD THERAPY FIELDS: CPT | Mod: 26 | Performed by: RADIOLOGY

## 2022-11-22 ASSESSMENT — PAIN SCALES - GENERAL: PAINLEVEL: NO PAIN

## 2022-11-22 NOTE — ON TREATMENT VISIT
ON TREATMENT NOTE  RADIATION ONCOLOGY DEPARTMENT    Patient name:  Surya Major    Primary Physician:  Pcp Pt States None MRN: [id number]  CSN: [id number]   Referring physician:  No ref. provider found : 1955, 67 y.o.     ENCOUNTER DATE:  22    DIAGNOSIS:    Prostate cancer (HCC)  Staging form: Prostate, AJCC 8th Edition  - Pathologic: Stage IIB (pT2, pN0, cM0, PSA: 12, Grade Group: 2) - Signed by Jarret Smith M.D. on 9/15/2022  Prostate specific antigen (PSA) range: 10 to 19  [place] score: 7  Histologic grading system: 5 grade system      TREATMENT SUMMARY:  [facility] Treatment Information          2022   [facility] Course Treatment Dates   Course First Treatment Date 10/31/2022     Course Last Treatment Date 2022     [facility] Treatment Summary   Prostate  Plan from Course C1_Pelvis   Fraction 18 of 28   Elapsed Course Days  @    Prescribed Fraction Dose 180 cGy   Prescribed Total Dose 5,040 cGy   Prostate  Reference Point from Course C1_Pelvis   Elapsed Course Days  @ [id number]   Session Dose 180 cGy   Total Dose 3,240 cGy   Prostate_CP  Reference Point from Course C1_Pelvis   Elapsed Course Days  @ [id number]   Session Dose 185 cGy   Total Dose 3,335 cGy      Radiation Treatments       Active   Plans   Prostate   Most recent treatment: Dose planned: 180 cGy (fraction 18 of 28 on 2022)   Total: Dose planned: 5,040 cGy   Elapsed Days:  @            Historical   No historical radiation treatments to show.               SUBJECTIVE:   Patient is doing well.  He does not have any changes he would attribute to his radiation.    VITAL SIGNS:    Encounter Vitals  Blood Pressure : 121/71  Pulse: 71  Pulse Oximetry: 97 %  Weight: 70.1 kg (154 lb 8.7 oz)  Pain Score: No pain      2022    10:27 AM 2022    10:43 AM 2022     9:54 AM 2022    11:24 AM 2022     8:23 AM   Pain Assessment   Pain Score NO PAIN NO PAIN NO PAIN NO PAIN NO PAIN           PHYSICAL EXAM:  Physical Exam  Genitourinary:     Comments: Normal       TOXICITY      11/22/2022    10:29 AM 11/16/2022    10:45 AM 11/9/2022     9:56 AM 11/2/2022    11:25 AM   Toxicity Assessment   Toxicity Assessment Male Pelvis Male Pelvis Male Pelvis Male Pelvis   Fatigue (lethargy, malaise, asthenia) None Increased fatigue over baseline, but not altering normal activities Increased fatigue over baseline, but not altering normal activities None   Radiation Dermatitis None None None None   Anorexia None Loss of appetite None None   Colitis None None None None   Constipation None None None None   Dehydration None None None None   Diarrhea w/o Colostomy Increase of <4 stools/day over pre-treatment Increase of <4 stools/day over pre-treatment None None   Flatulence None None Mild None   Nausea None None None None   Proctitis None None None None   Vomiting None None None None   RT - Pain due to RT None None None None   Tumor Pain (onset or exacerbation of tumor pain due to treatment) None None None None   Dysuria (painful urination) None None Mild symptoms requiring no intervention None   Urinary Frequency Normal Normal Normal Normal   Urinary Urgency None None None None   Bladder Spasms Absent Absent Absent Absent   Incontinence None None None None   Urinary Retention Normal Normal Normal Normal         IMPRESSION:  Cancer Staging   Prostate cancer (HCC)  Staging form: Prostate, AJCC 8th Edition  - Clinical: No stage assigned - Unsigned  - Pathologic: Stage IIB (pT2, pN0, cM0, PSA: 12, Grade Group: 2) - Signed by Jarret Smith M.D. on 9/15/2022      PLAN:  No change in treatment plan    Disposition:  Treatment plan reviewed. Questions answered. Continue therapy outlined.     Jarret Smith M.D.    No orders of the defined types were placed in this encounter.

## 2022-11-23 ENCOUNTER — HOSPITAL ENCOUNTER (OUTPATIENT)
Dept: RADIATION ONCOLOGY | Facility: MEDICAL CENTER | Age: 67
End: 2022-11-23
Payer: COMMERCIAL

## 2022-11-23 LAB
CHEMOTHERAPY INFUSION START DATE: NORMAL
CHEMOTHERAPY RECORDS: 1.8
CHEMOTHERAPY RECORDS: 5040
CHEMOTHERAPY RECORDS: NORMAL
CHEMOTHERAPY RX CANCER: NORMAL
DATE 1ST CHEMO CANCER: NORMAL
RAD ONC ARIA COURSE LAST TREATMENT DATE: NORMAL
RAD ONC ARIA COURSE TREATMENT ELAPSED DAYS: NORMAL
RAD ONC ARIA REFERENCE POINT DOSAGE GIVEN TO DATE: 34.2
RAD ONC ARIA REFERENCE POINT DOSAGE GIVEN TO DATE: 35.2
RAD ONC ARIA REFERENCE POINT ID: NORMAL
RAD ONC ARIA REFERENCE POINT ID: NORMAL
RAD ONC ARIA REFERENCE POINT SESSION DOSAGE GIVEN: 1.8
RAD ONC ARIA REFERENCE POINT SESSION DOSAGE GIVEN: 1.85

## 2022-11-23 PROCEDURE — 77014 PR CT GUIDANCE PLACEMENT RAD THERAPY FIELDS: CPT | Mod: 26 | Performed by: RADIOLOGY

## 2022-11-23 PROCEDURE — 77385 HCHG IMRT DELIVERY SIMPLE: CPT | Performed by: RADIOLOGY

## 2022-11-28 ENCOUNTER — HOSPITAL ENCOUNTER (OUTPATIENT)
Dept: RADIATION ONCOLOGY | Facility: MEDICAL CENTER | Age: 67
End: 2022-11-28
Payer: COMMERCIAL

## 2022-11-28 LAB
CHEMOTHERAPY INFUSION START DATE: NORMAL
CHEMOTHERAPY RECORDS: 1.8
CHEMOTHERAPY RECORDS: 5040
CHEMOTHERAPY RECORDS: NORMAL
CHEMOTHERAPY RX CANCER: NORMAL
DATE 1ST CHEMO CANCER: NORMAL
RAD ONC ARIA COURSE LAST TREATMENT DATE: NORMAL
RAD ONC ARIA COURSE TREATMENT ELAPSED DAYS: NORMAL
RAD ONC ARIA REFERENCE POINT DOSAGE GIVEN TO DATE: 36
RAD ONC ARIA REFERENCE POINT DOSAGE GIVEN TO DATE: 37.05
RAD ONC ARIA REFERENCE POINT ID: NORMAL
RAD ONC ARIA REFERENCE POINT ID: NORMAL
RAD ONC ARIA REFERENCE POINT SESSION DOSAGE GIVEN: 1.8
RAD ONC ARIA REFERENCE POINT SESSION DOSAGE GIVEN: 1.85

## 2022-11-28 PROCEDURE — 77427 RADIATION TX MANAGEMENT X5: CPT | Performed by: RADIOLOGY

## 2022-11-28 PROCEDURE — 77014 PR CT GUIDANCE PLACEMENT RAD THERAPY FIELDS: CPT | Mod: 26 | Performed by: RADIOLOGY

## 2022-11-28 PROCEDURE — 77385 HCHG IMRT DELIVERY SIMPLE: CPT | Performed by: RADIOLOGY

## 2022-11-29 ENCOUNTER — HOSPITAL ENCOUNTER (OUTPATIENT)
Dept: RADIATION ONCOLOGY | Facility: MEDICAL CENTER | Age: 67
End: 2022-11-29
Payer: COMMERCIAL

## 2022-11-29 LAB
CHEMOTHERAPY INFUSION START DATE: NORMAL
CHEMOTHERAPY RECORDS: 1.8
CHEMOTHERAPY RECORDS: 5040
CHEMOTHERAPY RECORDS: NORMAL
CHEMOTHERAPY RX CANCER: NORMAL
DATE 1ST CHEMO CANCER: NORMAL
RAD ONC ARIA COURSE LAST TREATMENT DATE: NORMAL
RAD ONC ARIA COURSE TREATMENT ELAPSED DAYS: NORMAL
RAD ONC ARIA REFERENCE POINT DOSAGE GIVEN TO DATE: 37.8
RAD ONC ARIA REFERENCE POINT DOSAGE GIVEN TO DATE: 38.91
RAD ONC ARIA REFERENCE POINT ID: NORMAL
RAD ONC ARIA REFERENCE POINT ID: NORMAL
RAD ONC ARIA REFERENCE POINT SESSION DOSAGE GIVEN: 1.8
RAD ONC ARIA REFERENCE POINT SESSION DOSAGE GIVEN: 1.85

## 2022-11-29 PROCEDURE — 77385 HCHG IMRT DELIVERY SIMPLE: CPT | Performed by: RADIOLOGY

## 2022-11-29 PROCEDURE — 77014 PR CT GUIDANCE PLACEMENT RAD THERAPY FIELDS: CPT | Mod: 26 | Performed by: RADIOLOGY

## 2022-11-30 ENCOUNTER — HOSPITAL ENCOUNTER (OUTPATIENT)
Dept: RADIATION ONCOLOGY | Facility: MEDICAL CENTER | Age: 67
End: 2022-11-30
Payer: COMMERCIAL

## 2022-11-30 VITALS
OXYGEN SATURATION: 98 % | DIASTOLIC BLOOD PRESSURE: 74 MMHG | BODY MASS INDEX: 21.49 KG/M2 | SYSTOLIC BLOOD PRESSURE: 121 MMHG | WEIGHT: 154.1 LBS | HEART RATE: 64 BPM

## 2022-11-30 LAB
CHEMOTHERAPY INFUSION START DATE: NORMAL
CHEMOTHERAPY RECORDS: 1.8
CHEMOTHERAPY RECORDS: 5040
CHEMOTHERAPY RECORDS: NORMAL
CHEMOTHERAPY RX CANCER: NORMAL
DATE 1ST CHEMO CANCER: NORMAL
RAD ONC ARIA COURSE LAST TREATMENT DATE: NORMAL
RAD ONC ARIA COURSE TREATMENT ELAPSED DAYS: NORMAL
RAD ONC ARIA REFERENCE POINT DOSAGE GIVEN TO DATE: 39.6
RAD ONC ARIA REFERENCE POINT DOSAGE GIVEN TO DATE: 40.76
RAD ONC ARIA REFERENCE POINT ID: NORMAL
RAD ONC ARIA REFERENCE POINT ID: NORMAL
RAD ONC ARIA REFERENCE POINT SESSION DOSAGE GIVEN: 1.8
RAD ONC ARIA REFERENCE POINT SESSION DOSAGE GIVEN: 1.85

## 2022-11-30 PROCEDURE — 77385 HCHG IMRT DELIVERY SIMPLE: CPT | Performed by: RADIOLOGY

## 2022-11-30 PROCEDURE — 77014 PR CT GUIDANCE PLACEMENT RAD THERAPY FIELDS: CPT | Mod: 26 | Performed by: RADIOLOGY

## 2022-11-30 ASSESSMENT — PAIN SCALES - GENERAL: PAINLEVEL: NO PAIN

## 2022-11-30 NOTE — ON TREATMENT VISIT
ON TREATMENT NOTE  RADIATION ONCOLOGY DEPARTMENT    Patient name:  Surya Major    Primary Physician:  Pcp Pt States None MRN: [id number]  CSN: [id number]   Referring physician:  No ref. provider found : 1955, 67 y.o.     ENCOUNTER DATE:  22    DIAGNOSIS:    Prostate cancer (HCC)  Staging form: Prostate, AJCC 8th Edition  - Pathologic: Stage IIB (pT2, pN0, cM0, PSA: 12, Grade Group: 2) - Signed by Jarret Smith M.D. on 9/15/2022  Prostate specific antigen (PSA) range: 10 to 19  [place] score: 7  Histologic grading system: 5 grade system      TREATMENT SUMMARY:  [facility] Treatment Information          2022   [facility] Course Treatment Dates   Course First Treatment Date 10/31/2022     Course Last Treatment Date 2022     [facility] Treatment Summary   Prostate  Plan from Course C1_Pelvis   Fraction    Elapsed Course Days  @ [id number]   Prescribed Fraction Dose 180 cGy   Prescribed Total Dose 5,040 cGy   Prostate  Reference Point from Course C1_Pelvis   Elapsed Course Days  @ [id number]   Session Dose 180 cGy   Total Dose 3,960 cGy   Prostate_CP  Reference Point from Course C1_Pelvis   Elapsed Course Days  @ [id number]   Session Dose 185 cGy   Total Dose 4,076 cGy      Radiation Treatments       Active   Plans   Prostate   Most recent treatment: Dose planned: 180 cGy (fraction  of 28 on 2022)   Total: Dose planned: 5,040 cGy   Elapsed Days:  @ [id number]           Historical   No historical radiation treatments to show.               SUBJECTIVE:   Patient is doing well.  He does not have any changes he would attribute to his radiation.    VITAL SIGNS:    Encounter Vitals  Blood Pressure : 121/74  Pulse: 64  Pulse Oximetry: 98 %  Weight: 69.9 kg (154 lb 1.6 oz)  Pain Score: No pain      2022    10:32 AM 2022    10:27 AM 2022    10:43 AM 2022     9:54 AM 2022    11:24 AM 2022     8:23 AM   Pain Assessment   Pain Score NO PAIN NO PAIN NO  PAIN NO PAIN NO PAIN NO PAIN          PHYSICAL EXAM:  Physical Exam  Genitourinary:     Comments: Normal       TOXICITY      11/30/2022    10:33 AM 11/22/2022    10:29 AM 11/16/2022    10:45 AM 11/9/2022     9:56 AM 11/2/2022    11:25 AM   Toxicity Assessment   Toxicity Assessment Male Pelvis Male Pelvis Male Pelvis Male Pelvis Male Pelvis   Fatigue (lethargy, malaise, asthenia) None None Increased fatigue over baseline, but not altering normal activities Increased fatigue over baseline, but not altering normal activities None   Radiation Dermatitis None None None None None   Anorexia None None Loss of appetite None None   Colitis None None None None None   Constipation None None None None None   Dehydration None None None None None   Diarrhea w/o Colostomy None Increase of <4 stools/day over pre-treatment Increase of <4 stools/day over pre-treatment None None   Flatulence None None None Mild None   Nausea None None None None None   Proctitis None None None None None   Vomiting None None None None None   RT - Pain due to RT None None None None None   Tumor Pain (onset or exacerbation of tumor pain due to treatment) None None None None None   Dysuria (painful urination) None None None Mild symptoms requiring no intervention None   Urinary Frequency Normal Normal Normal Normal Normal   Urinary Urgency None None None None None   Bladder Spasms Absent Absent Absent Absent Absent   Incontinence None None None None None   Urinary Retention Normal Normal Normal Normal Normal         IMPRESSION:  Cancer Staging   Prostate cancer (HCC)  Staging form: Prostate, AJCC 8th Edition  - Clinical: No stage assigned - Unsigned  - Pathologic: Stage IIB (pT2, pN0, cM0, PSA: 12, Grade Group: 2) - Signed by Jarret Smith M.D. on 9/15/2022      PLAN:  No change in treatment plan    Disposition:  Treatment plan reviewed. Questions answered. Continue therapy outlined.     Jarret Smith M.D.    No orders of the defined types were  placed in this encounter.

## 2022-12-01 ENCOUNTER — HOSPITAL ENCOUNTER (OUTPATIENT)
Dept: RADIATION ONCOLOGY | Facility: MEDICAL CENTER | Age: 67
End: 2022-12-01

## 2022-12-01 ENCOUNTER — HOSPITAL ENCOUNTER (OUTPATIENT)
Dept: RADIATION ONCOLOGY | Facility: MEDICAL CENTER | Age: 67
End: 2022-12-31
Attending: RADIOLOGY
Payer: COMMERCIAL

## 2022-12-01 LAB
CHEMOTHERAPY INFUSION START DATE: NORMAL
CHEMOTHERAPY RECORDS: 1.8
CHEMOTHERAPY RECORDS: 5040
CHEMOTHERAPY RECORDS: NORMAL
CHEMOTHERAPY RX CANCER: NORMAL
DATE 1ST CHEMO CANCER: NORMAL
RAD ONC ARIA COURSE LAST TREATMENT DATE: NORMAL
RAD ONC ARIA COURSE TREATMENT ELAPSED DAYS: NORMAL
RAD ONC ARIA REFERENCE POINT DOSAGE GIVEN TO DATE: 41.4
RAD ONC ARIA REFERENCE POINT DOSAGE GIVEN TO DATE: 42.61
RAD ONC ARIA REFERENCE POINT ID: NORMAL
RAD ONC ARIA REFERENCE POINT ID: NORMAL
RAD ONC ARIA REFERENCE POINT SESSION DOSAGE GIVEN: 1.8
RAD ONC ARIA REFERENCE POINT SESSION DOSAGE GIVEN: 1.85

## 2022-12-01 PROCEDURE — 77385 HCHG IMRT DELIVERY SIMPLE: CPT | Performed by: RADIOLOGY

## 2022-12-01 PROCEDURE — 77336 RADIATION PHYSICS CONSULT: CPT | Performed by: RADIOLOGY

## 2022-12-01 PROCEDURE — 77014 PR CT GUIDANCE PLACEMENT RAD THERAPY FIELDS: CPT | Mod: 26 | Performed by: RADIOLOGY

## 2022-12-02 ENCOUNTER — HOSPITAL ENCOUNTER (OUTPATIENT)
Dept: RADIATION ONCOLOGY | Facility: MEDICAL CENTER | Age: 67
End: 2022-12-02
Payer: COMMERCIAL

## 2022-12-02 LAB
CHEMOTHERAPY INFUSION START DATE: NORMAL
CHEMOTHERAPY RECORDS: 1.8
CHEMOTHERAPY RECORDS: 5040
CHEMOTHERAPY RECORDS: NORMAL
CHEMOTHERAPY RX CANCER: NORMAL
DATE 1ST CHEMO CANCER: NORMAL
RAD ONC ARIA COURSE LAST TREATMENT DATE: NORMAL
RAD ONC ARIA COURSE TREATMENT ELAPSED DAYS: NORMAL
RAD ONC ARIA REFERENCE POINT DOSAGE GIVEN TO DATE: 43.2
RAD ONC ARIA REFERENCE POINT DOSAGE GIVEN TO DATE: 44.46
RAD ONC ARIA REFERENCE POINT ID: NORMAL
RAD ONC ARIA REFERENCE POINT ID: NORMAL
RAD ONC ARIA REFERENCE POINT SESSION DOSAGE GIVEN: 1.8
RAD ONC ARIA REFERENCE POINT SESSION DOSAGE GIVEN: 1.85

## 2022-12-02 PROCEDURE — 77385 HCHG IMRT DELIVERY SIMPLE: CPT | Performed by: RADIOLOGY

## 2022-12-02 PROCEDURE — 77014 PR CT GUIDANCE PLACEMENT RAD THERAPY FIELDS: CPT | Mod: 26 | Performed by: RADIOLOGY

## 2022-12-05 ENCOUNTER — HOSPITAL ENCOUNTER (OUTPATIENT)
Dept: RADIATION ONCOLOGY | Facility: MEDICAL CENTER | Age: 67
End: 2022-12-05
Payer: COMMERCIAL

## 2022-12-05 LAB
CHEMOTHERAPY INFUSION START DATE: NORMAL
CHEMOTHERAPY RECORDS: 1.8
CHEMOTHERAPY RECORDS: 5040
CHEMOTHERAPY RECORDS: NORMAL
CHEMOTHERAPY RX CANCER: NORMAL
DATE 1ST CHEMO CANCER: NORMAL
RAD ONC ARIA COURSE LAST TREATMENT DATE: NORMAL
RAD ONC ARIA COURSE TREATMENT ELAPSED DAYS: NORMAL
RAD ONC ARIA REFERENCE POINT DOSAGE GIVEN TO DATE: 45
RAD ONC ARIA REFERENCE POINT DOSAGE GIVEN TO DATE: 46.32
RAD ONC ARIA REFERENCE POINT ID: NORMAL
RAD ONC ARIA REFERENCE POINT ID: NORMAL
RAD ONC ARIA REFERENCE POINT SESSION DOSAGE GIVEN: 1.8
RAD ONC ARIA REFERENCE POINT SESSION DOSAGE GIVEN: 1.85

## 2022-12-05 PROCEDURE — 77385 HCHG IMRT DELIVERY SIMPLE: CPT | Performed by: RADIOLOGY

## 2022-12-05 PROCEDURE — 77427 RADIATION TX MANAGEMENT X5: CPT | Performed by: RADIOLOGY

## 2022-12-05 PROCEDURE — 77300 RADIATION THERAPY DOSE PLAN: CPT | Mod: 26 | Performed by: RADIOLOGY

## 2022-12-05 PROCEDURE — 77338 DESIGN MLC DEVICE FOR IMRT: CPT | Mod: 26 | Performed by: RADIOLOGY

## 2022-12-05 PROCEDURE — 77014 PR CT GUIDANCE PLACEMENT RAD THERAPY FIELDS: CPT | Mod: 26 | Performed by: RADIOLOGY

## 2022-12-05 PROCEDURE — 77300 RADIATION THERAPY DOSE PLAN: CPT | Performed by: RADIOLOGY

## 2022-12-05 PROCEDURE — 77338 DESIGN MLC DEVICE FOR IMRT: CPT | Mod: XU | Performed by: RADIOLOGY

## 2022-12-06 ENCOUNTER — HOSPITAL ENCOUNTER (OUTPATIENT)
Dept: RADIATION ONCOLOGY | Facility: MEDICAL CENTER | Age: 67
End: 2022-12-06
Payer: COMMERCIAL

## 2022-12-06 LAB
CHEMOTHERAPY INFUSION START DATE: NORMAL
CHEMOTHERAPY RECORDS: 1.8
CHEMOTHERAPY RECORDS: 5040
CHEMOTHERAPY RECORDS: NORMAL
CHEMOTHERAPY RX CANCER: NORMAL
DATE 1ST CHEMO CANCER: NORMAL
RAD ONC ARIA COURSE LAST TREATMENT DATE: NORMAL
RAD ONC ARIA COURSE TREATMENT ELAPSED DAYS: NORMAL
RAD ONC ARIA REFERENCE POINT DOSAGE GIVEN TO DATE: 46.8
RAD ONC ARIA REFERENCE POINT DOSAGE GIVEN TO DATE: 48.17
RAD ONC ARIA REFERENCE POINT ID: NORMAL
RAD ONC ARIA REFERENCE POINT ID: NORMAL
RAD ONC ARIA REFERENCE POINT SESSION DOSAGE GIVEN: 1.8
RAD ONC ARIA REFERENCE POINT SESSION DOSAGE GIVEN: 1.85

## 2022-12-06 PROCEDURE — 77385 HCHG IMRT DELIVERY SIMPLE: CPT | Performed by: RADIOLOGY

## 2022-12-06 PROCEDURE — 77014 PR CT GUIDANCE PLACEMENT RAD THERAPY FIELDS: CPT | Mod: 26 | Performed by: RADIOLOGY

## 2022-12-07 ENCOUNTER — HOSPITAL ENCOUNTER (OUTPATIENT)
Dept: RADIATION ONCOLOGY | Facility: MEDICAL CENTER | Age: 67
End: 2022-12-07
Payer: COMMERCIAL

## 2022-12-07 VITALS
WEIGHT: 152.78 LBS | BODY MASS INDEX: 21.31 KG/M2 | SYSTOLIC BLOOD PRESSURE: 134 MMHG | DIASTOLIC BLOOD PRESSURE: 76 MMHG | HEART RATE: 69 BPM | OXYGEN SATURATION: 97 %

## 2022-12-07 LAB
CHEMOTHERAPY INFUSION START DATE: NORMAL
CHEMOTHERAPY RECORDS: 1.8
CHEMOTHERAPY RECORDS: 5040
CHEMOTHERAPY RECORDS: NORMAL
CHEMOTHERAPY RX CANCER: NORMAL
DATE 1ST CHEMO CANCER: NORMAL
RAD ONC ARIA COURSE LAST TREATMENT DATE: NORMAL
RAD ONC ARIA COURSE TREATMENT ELAPSED DAYS: NORMAL
RAD ONC ARIA REFERENCE POINT DOSAGE GIVEN TO DATE: 48.6
RAD ONC ARIA REFERENCE POINT DOSAGE GIVEN TO DATE: 50.02
RAD ONC ARIA REFERENCE POINT ID: NORMAL
RAD ONC ARIA REFERENCE POINT ID: NORMAL
RAD ONC ARIA REFERENCE POINT SESSION DOSAGE GIVEN: 1.8
RAD ONC ARIA REFERENCE POINT SESSION DOSAGE GIVEN: 1.85

## 2022-12-07 PROCEDURE — 77385 HCHG IMRT DELIVERY SIMPLE: CPT | Performed by: RADIOLOGY

## 2022-12-07 PROCEDURE — 77014 PR CT GUIDANCE PLACEMENT RAD THERAPY FIELDS: CPT | Mod: 26 | Performed by: RADIOLOGY

## 2022-12-07 ASSESSMENT — PAIN SCALES - GENERAL: PAINLEVEL: NO PAIN

## 2022-12-07 NOTE — ON TREATMENT VISIT
ON TREATMENT NOTE  RADIATION ONCOLOGY DEPARTMENT    Patient name:  Surya Major    Primary Physician:  Pcp Pt States None MRN: [id number]  CSN: [id number]   Referring physician:  No ref. provider found : 1955, 67 y.o.     ENCOUNTER DATE:  22    DIAGNOSIS:    Prostate cancer (HCC)  Staging form: Prostate, AJCC 8th Edition  - Pathologic: Stage IIB (pT2, pN0, cM0, PSA: 12, Grade Group: 2) - Signed by Jarret Smith M.D. on 9/15/2022  Prostate specific antigen (PSA) range: 10 to 19  [place] score: 7  Histologic grading system: 5 grade system      TREATMENT SUMMARY:  [facility] Treatment Information          2022   [facility] Course Treatment Dates   Course First Treatment Date 10/31/2022     Course Last Treatment Date 2022     [facility] Treatment Summary   Prostate  Plan from Course C1_Pelvis   Fraction 27 of 28   Elapsed Course Days 37 @    Prescribed Fraction Dose 180 cGy   Prescribed Total Dose 5,040 cGy   Prostate  Reference Point from Course C1_Pelvis   Elapsed Course Days 37 @ [id number]   Session Dose 180 cGy   Total Dose 4,860 cGy   Prostate_CP  Reference Point from Course C1_Pelvis   Elapsed Course Days 37 @    Session Dose 185 cGy   Total Dose 5,002 cGy      Radiation Treatments       Active   Plans   Prostate   Most recent treatment: Dose planned: 180 cGy (fraction 27 of 28 on 2022)   Total: Dose planned: 5,040 cGy   Elapsed Days: 37 @            Historical   No historical radiation treatments to show.               SUBJECTIVE:   Patient is doing well.  He continues to have a slightly unsettled feeling in his bowels but does not require any medication.  Today he had more difficulty with his bowel and bladder regimen as he got up an hour later than his usual preparation time.    VITAL SIGNS:    Encounter Vitals  Blood Pressure : 134/76  Pulse: 69  Pulse Oximetry: 97 %  Weight: 69.3 kg (152 lb 12.5 oz)  Pain Score: No pain      2022    10:14 AM  11/30/2022    10:32 AM 11/22/2022    10:27 AM 11/16/2022    10:43 AM 11/9/2022     9:54 AM 11/2/2022    11:24 AM   Pain Assessment   Pain Score NO PAIN NO PAIN NO PAIN NO PAIN NO PAIN NO PAIN          PHYSICAL EXAM:  Physical Exam  Genitourinary:     Comments: Normal       TOXICITY      12/7/2022    10:15 AM 11/30/2022    10:33 AM 11/22/2022    10:29 AM 11/16/2022    10:45 AM 11/9/2022     9:56 AM 11/2/2022    11:25 AM   Toxicity Assessment   Toxicity Assessment Male Pelvis Male Pelvis Male Pelvis Male Pelvis Male Pelvis Male Pelvis   Fatigue (lethargy, malaise, asthenia) Increased fatigue over baseline, but not altering normal activities None None Increased fatigue over baseline, but not altering normal activities Increased fatigue over baseline, but not altering normal activities None   Radiation Dermatitis None None None None None None   Anorexia None None None Loss of appetite None None   Colitis None None None None None None   Constipation None None None None None None   Dehydration None None None None None None   Diarrhea w/o Colostomy None None Increase of <4 stools/day over pre-treatment Increase of <4 stools/day over pre-treatment None None   Flatulence Mild None None None Mild None   Nausea Able to eat None None None None None   Proctitis None None None None None None   Vomiting None None None None None None   RT - Pain due to RT None None None None None None   Tumor Pain (onset or exacerbation of tumor pain due to treatment) None None None None None None   Dysuria (painful urination) None None None None Mild symptoms requiring no intervention None   Urinary Frequency Normal Normal Normal Normal Normal Normal   Urinary Urgency None None None None None None   Bladder Spasms Absent Absent Absent Absent Absent Absent   Incontinence None None None None None None   Urinary Retention Normal Normal Normal Normal Normal Normal         IMPRESSION:  Cancer Staging   Prostate cancer (HCC)  Staging form: Prostate,  AJCC 8th Edition  - Clinical: No stage assigned - Unsigned  - Pathologic: Stage IIB (pT2, pN0, cM0, PSA: 12, Grade Group: 2) - Signed by Jarret Smith M.D. on 9/15/2022      PLAN:  No change in treatment plan    Disposition:  Treatment plan reviewed. Questions answered. Continue therapy outlined.     Jarret Smith M.D.    No orders of the defined types were placed in this encounter.

## 2022-12-08 ENCOUNTER — HOSPITAL ENCOUNTER (OUTPATIENT)
Dept: RADIATION ONCOLOGY | Facility: MEDICAL CENTER | Age: 67
End: 2022-12-08
Payer: COMMERCIAL

## 2022-12-08 LAB
CHEMOTHERAPY INFUSION START DATE: NORMAL
CHEMOTHERAPY RECORDS: 1.8
CHEMOTHERAPY RECORDS: 5040
CHEMOTHERAPY RECORDS: NORMAL
CHEMOTHERAPY RX CANCER: NORMAL
DATE 1ST CHEMO CANCER: NORMAL
RAD ONC ARIA COURSE LAST TREATMENT DATE: NORMAL
RAD ONC ARIA COURSE TREATMENT ELAPSED DAYS: NORMAL
RAD ONC ARIA REFERENCE POINT DOSAGE GIVEN TO DATE: 50.4
RAD ONC ARIA REFERENCE POINT DOSAGE GIVEN TO DATE: 51.87
RAD ONC ARIA REFERENCE POINT ID: NORMAL
RAD ONC ARIA REFERENCE POINT ID: NORMAL
RAD ONC ARIA REFERENCE POINT SESSION DOSAGE GIVEN: 1.8
RAD ONC ARIA REFERENCE POINT SESSION DOSAGE GIVEN: 1.85

## 2022-12-08 PROCEDURE — 77014 PR CT GUIDANCE PLACEMENT RAD THERAPY FIELDS: CPT | Mod: 26 | Performed by: RADIOLOGY

## 2022-12-08 PROCEDURE — 77336 RADIATION PHYSICS CONSULT: CPT | Performed by: RADIOLOGY

## 2022-12-08 PROCEDURE — 77385 HCHG IMRT DELIVERY SIMPLE: CPT | Performed by: RADIOLOGY

## 2022-12-09 ENCOUNTER — HOSPITAL ENCOUNTER (OUTPATIENT)
Dept: RADIATION ONCOLOGY | Facility: MEDICAL CENTER | Age: 67
End: 2022-12-09

## 2022-12-09 LAB
CHEMOTHERAPY INFUSION START DATE: NORMAL
CHEMOTHERAPY RECORDS: 1.8
CHEMOTHERAPY RECORDS: 1980
CHEMOTHERAPY RECORDS: NORMAL
CHEMOTHERAPY RX CANCER: NORMAL
DATE 1ST CHEMO CANCER: NORMAL
RAD ONC ARIA COURSE LAST TREATMENT DATE: NORMAL
RAD ONC ARIA COURSE TREATMENT ELAPSED DAYS: NORMAL
RAD ONC ARIA REFERENCE POINT DOSAGE GIVEN TO DATE: 1.8
RAD ONC ARIA REFERENCE POINT DOSAGE GIVEN TO DATE: 1.9
RAD ONC ARIA REFERENCE POINT ID: NORMAL
RAD ONC ARIA REFERENCE POINT ID: NORMAL
RAD ONC ARIA REFERENCE POINT SESSION DOSAGE GIVEN: 1.8
RAD ONC ARIA REFERENCE POINT SESSION DOSAGE GIVEN: 1.9

## 2022-12-09 PROCEDURE — 77014 PR CT GUIDANCE PLACEMENT RAD THERAPY FIELDS: CPT | Mod: 26 | Performed by: RADIOLOGY

## 2022-12-09 PROCEDURE — 77385 HCHG IMRT DELIVERY SIMPLE: CPT | Performed by: RADIOLOGY

## 2022-12-09 NOTE — CT SIMULATION
DATE OF SERVICE: 12/9/2022    Radiation Therapy Episodes       Active Episodes       Radiation Therapy: IMRT                   Radiation Treatments         Plan Last Treated On Elapsed Days Fractions Treated Prescribed Fraction Dose (cGy) Prescribed Total Dose (cGy)    Prostate 12/8/2022 38 @ [id number] 28 of 28 180 5,040    Prostate [facility] 12/9/2022 39 @ [id number] 1 of 11 180 1,980                  Reference Point Last Treated On Elapsed Days Most Recent Session Dose (cGy) Total Dose (cGy)    Prostate 12/8/2022 38 @ [id number] 180 5,040    Prostate_[facility] 12/9/2022 39 @ [id number] 180 180    Prostate_Bst_CP 12/9/2022 39 @ [id number] 190 190    Prostate_CP 12/8/2022 38 @ [id number] 185 5,187                            First Visit Simple Simulation: Called by [place] machine to verify treatment parameters including:  treatment site, treatment dose, and treatment setup prior to first treatment. Image derived shifts reviewed in all appropriate plains.  Shifts approved.  Patient treated.    I have personally reviewed the relevant data, performed the target localization, and determined all relevant factors for this patient’s simulation.

## 2022-12-12 ENCOUNTER — HOSPITAL ENCOUNTER (OUTPATIENT)
Dept: RADIATION ONCOLOGY | Facility: MEDICAL CENTER | Age: 67
End: 2022-12-12
Payer: COMMERCIAL

## 2022-12-12 LAB
CHEMOTHERAPY INFUSION START DATE: NORMAL
CHEMOTHERAPY RECORDS: 1.8
CHEMOTHERAPY RECORDS: 1980
CHEMOTHERAPY RECORDS: NORMAL
CHEMOTHERAPY RX CANCER: NORMAL
DATE 1ST CHEMO CANCER: NORMAL
RAD ONC ARIA COURSE LAST TREATMENT DATE: NORMAL
RAD ONC ARIA COURSE TREATMENT ELAPSED DAYS: NORMAL
RAD ONC ARIA REFERENCE POINT DOSAGE GIVEN TO DATE: 3.6
RAD ONC ARIA REFERENCE POINT DOSAGE GIVEN TO DATE: 3.81
RAD ONC ARIA REFERENCE POINT ID: NORMAL
RAD ONC ARIA REFERENCE POINT ID: NORMAL
RAD ONC ARIA REFERENCE POINT SESSION DOSAGE GIVEN: 1.8
RAD ONC ARIA REFERENCE POINT SESSION DOSAGE GIVEN: 1.9

## 2022-12-12 PROCEDURE — 77427 RADIATION TX MANAGEMENT X5: CPT | Performed by: RADIOLOGY

## 2022-12-12 PROCEDURE — 77385 HCHG IMRT DELIVERY SIMPLE: CPT | Performed by: RADIOLOGY

## 2022-12-12 PROCEDURE — 77014 PR CT GUIDANCE PLACEMENT RAD THERAPY FIELDS: CPT | Mod: 26 | Performed by: RADIOLOGY

## 2022-12-13 ENCOUNTER — HOSPITAL ENCOUNTER (OUTPATIENT)
Dept: RADIATION ONCOLOGY | Facility: MEDICAL CENTER | Age: 67
End: 2022-12-13
Payer: COMMERCIAL

## 2022-12-13 LAB
CHEMOTHERAPY INFUSION START DATE: NORMAL
CHEMOTHERAPY RECORDS: 1.8
CHEMOTHERAPY RECORDS: 1980
CHEMOTHERAPY RECORDS: NORMAL
CHEMOTHERAPY RX CANCER: NORMAL
DATE 1ST CHEMO CANCER: NORMAL
RAD ONC ARIA COURSE LAST TREATMENT DATE: NORMAL
RAD ONC ARIA COURSE TREATMENT ELAPSED DAYS: NORMAL
RAD ONC ARIA REFERENCE POINT DOSAGE GIVEN TO DATE: 5.4
RAD ONC ARIA REFERENCE POINT DOSAGE GIVEN TO DATE: 5.71
RAD ONC ARIA REFERENCE POINT ID: NORMAL
RAD ONC ARIA REFERENCE POINT ID: NORMAL
RAD ONC ARIA REFERENCE POINT SESSION DOSAGE GIVEN: 1.8
RAD ONC ARIA REFERENCE POINT SESSION DOSAGE GIVEN: 1.9

## 2022-12-13 PROCEDURE — 77014 PR CT GUIDANCE PLACEMENT RAD THERAPY FIELDS: CPT | Mod: 26 | Performed by: RADIOLOGY

## 2022-12-13 PROCEDURE — 77385 HCHG IMRT DELIVERY SIMPLE: CPT | Performed by: RADIOLOGY

## 2022-12-14 ENCOUNTER — HOSPITAL ENCOUNTER (OUTPATIENT)
Dept: RADIATION ONCOLOGY | Facility: MEDICAL CENTER | Age: 67
End: 2022-12-14
Payer: COMMERCIAL

## 2022-12-14 VITALS
DIASTOLIC BLOOD PRESSURE: 68 MMHG | SYSTOLIC BLOOD PRESSURE: 113 MMHG | BODY MASS INDEX: 21.43 KG/M2 | HEART RATE: 66 BPM | WEIGHT: 153.66 LBS | OXYGEN SATURATION: 99 %

## 2022-12-14 LAB
CHEMOTHERAPY INFUSION START DATE: NORMAL
CHEMOTHERAPY RECORDS: 1.8
CHEMOTHERAPY RECORDS: 1980
CHEMOTHERAPY RECORDS: NORMAL
CHEMOTHERAPY RX CANCER: NORMAL
DATE 1ST CHEMO CANCER: NORMAL
RAD ONC ARIA COURSE LAST TREATMENT DATE: NORMAL
RAD ONC ARIA COURSE TREATMENT ELAPSED DAYS: NORMAL
RAD ONC ARIA REFERENCE POINT DOSAGE GIVEN TO DATE: 7.2
RAD ONC ARIA REFERENCE POINT DOSAGE GIVEN TO DATE: 7.61
RAD ONC ARIA REFERENCE POINT ID: NORMAL
RAD ONC ARIA REFERENCE POINT ID: NORMAL
RAD ONC ARIA REFERENCE POINT SESSION DOSAGE GIVEN: 1.8
RAD ONC ARIA REFERENCE POINT SESSION DOSAGE GIVEN: 1.9

## 2022-12-14 PROCEDURE — 77385 HCHG IMRT DELIVERY SIMPLE: CPT | Performed by: RADIOLOGY

## 2022-12-14 PROCEDURE — 77014 PR CT GUIDANCE PLACEMENT RAD THERAPY FIELDS: CPT | Mod: 26 | Performed by: RADIOLOGY

## 2022-12-14 ASSESSMENT — PAIN SCALES - GENERAL: PAINLEVEL: NO PAIN

## 2022-12-14 NOTE — ON TREATMENT VISIT
ON TREATMENT NOTE  RADIATION ONCOLOGY DEPARTMENT    Patient name:  Surya Major    Primary Physician:  Pcp Pt States None MRN: [id number]  CSN: [id number]   Referring physician:  No ref. provider found : 1955, 67 y.o.     ENCOUNTER DATE:  22    DIAGNOSIS:    Prostate cancer (HCC)  Staging form: Prostate, AJCC 8th Edition  - Pathologic: Stage IIB (pT2, pN0, cM0, PSA: 12, Grade Group: 2) - Signed by Jarret Smith M.D. on 9/15/2022  Prostate specific antigen (PSA) range: 10 to 19  [place] score: 7  Histologic grading system: 5 grade system      TREATMENT SUMMARY:  Aria Treatment Information          2022   Aria Course Treatment Dates   Course First Treatment Date 10/31/2022     Course Last Treatment Date 2022     Aria Treatment Summary   Prostate Bst  Plan from Course C1_Pelvis   Fraction 4 of 11   Elapsed Course Days 44 @    Prescribed Fraction Dose 180 cGy   Prescribed Total Dose 1,980 cGy   Prostate_Bst  Reference Point from Course C1_Pelvis   Elapsed Course Days 44 @    Session Dose 180 cGy   Total Dose 720 cGy   Prostate_Bst_CP  Reference Point from Course C1_Pelvis   Elapsed Course Days 44 @    Session Dose 190 cGy   Total Dose 761 cGy      Radiation Treatments       Active   Plans   Prostate   Most recent treatment: Dose planned: 180 cGy (fraction 28 of 28 on 2022)   Total: Dose planned: 5,040 cGy   Elapsed Days: 38 @ [id number]      Prostate Bst   Most recent treatment: Dose planned: 180 cGy (fraction 4 of 11 on 2022)   Total: Dose planned: 1,980 cGy   Elapsed Days: 44 @            Historical   No historical radiation treatments to show.               SUBJECTIVE:   Patient is doing well.  He has a very minimal unsettled feeling in his abdomen.  Otherwise no symptoms attributable radiation.    VITAL SIGNS:    Encounter Vitals  Blood Pressure : 113/68  Pulse: 66  Pulse Oximetry: 99 %  Weight: 69.7 kg (153 lb 10.6  oz)  Pain Score: No pain      12/14/2022    10:42 AM 12/7/2022    10:14 AM 11/30/2022    10:32 AM 11/22/2022    10:27 AM 11/16/2022    10:43 AM 11/9/2022     9:54 AM   Pain Assessment   Pain Score NO PAIN NO PAIN NO PAIN NO PAIN NO PAIN NO PAIN          PHYSICAL EXAM:  Physical Exam  Abdominal:      General: Abdomen is flat.      Palpations: Abdomen is soft.        TOXICITY      12/14/2022    10:44 AM 12/7/2022    10:15 AM 11/30/2022    10:33 AM 11/22/2022    10:29 AM 11/16/2022    10:45 AM 11/9/2022     9:56 AM 11/2/2022    11:25 AM   Toxicity Assessment   Toxicity Assessment Male Pelvis Male Pelvis Male Pelvis Male Pelvis Male Pelvis Male Pelvis Male Pelvis   Fatigue (lethargy, malaise, asthenia) None Increased fatigue over baseline, but not altering normal activities None None Increased fatigue over baseline, but not altering normal activities Increased fatigue over baseline, but not altering normal activities None   Radiation Dermatitis Faint erythema or dry desquamation None None None None None None   Anorexia None None None None Loss of appetite None None   Colitis None None None None None None None   Constipation None None None None None None None   Dehydration None None None None None None None   Diarrhea w/o Colostomy None None None Increase of <4 stools/day over pre-treatment Increase of <4 stools/day over pre-treatment None None   Flatulence None Mild None None None Mild None   Nausea Able to eat Able to eat None None None None None   Proctitis None None None None None None None   Vomiting None None None None None None None   RT - Pain due to RT None None None None None None None   Tumor Pain (onset or exacerbation of tumor pain due to treatment) None None None None None None None   Dysuria (painful urination) None None None None None Mild symptoms requiring no intervention None   Urinary Frequency Normal Normal Normal Normal Normal Normal Normal   Urinary Urgency None None None None None None None    Bladder Spasms Absent Absent Absent Absent Absent Absent Absent   Incontinence None None None None None None None   Urinary Retention Normal Normal Normal Normal Normal Normal Normal         IMPRESSION:  Cancer Staging   Prostate cancer (HCC)  Staging form: Prostate, AJCC 8th Edition  - Clinical: No stage assigned - Unsigned  - Pathologic: Stage IIB (pT2, pN0, cM0, PSA: 12, Grade Group: 2) - Signed by Jarret Smith M.D. on 9/15/2022      PLAN:  No change in treatment plan    Disposition:  Treatment plan reviewed. Questions answered. Continue therapy outlined.     Jarret Smith M.D.    No orders of the defined types were placed in this encounter.

## 2022-12-15 ENCOUNTER — HOSPITAL ENCOUNTER (OUTPATIENT)
Dept: RADIATION ONCOLOGY | Facility: MEDICAL CENTER | Age: 67
End: 2022-12-15
Payer: COMMERCIAL

## 2022-12-15 LAB
CHEMOTHERAPY INFUSION START DATE: NORMAL
CHEMOTHERAPY RECORDS: 1.8
CHEMOTHERAPY RECORDS: 1980
CHEMOTHERAPY RECORDS: NORMAL
CHEMOTHERAPY RX CANCER: NORMAL
DATE 1ST CHEMO CANCER: NORMAL
RAD ONC ARIA COURSE LAST TREATMENT DATE: NORMAL
RAD ONC ARIA COURSE TREATMENT ELAPSED DAYS: NORMAL
RAD ONC ARIA REFERENCE POINT DOSAGE GIVEN TO DATE: 9
RAD ONC ARIA REFERENCE POINT DOSAGE GIVEN TO DATE: 9.51
RAD ONC ARIA REFERENCE POINT ID: NORMAL
RAD ONC ARIA REFERENCE POINT ID: NORMAL
RAD ONC ARIA REFERENCE POINT SESSION DOSAGE GIVEN: 1.8
RAD ONC ARIA REFERENCE POINT SESSION DOSAGE GIVEN: 1.9

## 2022-12-15 PROCEDURE — 77014 PR CT GUIDANCE PLACEMENT RAD THERAPY FIELDS: CPT | Mod: 26 | Performed by: RADIOLOGY

## 2022-12-15 PROCEDURE — 77336 RADIATION PHYSICS CONSULT: CPT | Performed by: RADIOLOGY

## 2022-12-15 PROCEDURE — 77385 HCHG IMRT DELIVERY SIMPLE: CPT | Performed by: RADIOLOGY

## 2022-12-16 ENCOUNTER — HOSPITAL ENCOUNTER (OUTPATIENT)
Dept: RADIATION ONCOLOGY | Facility: MEDICAL CENTER | Age: 67
End: 2022-12-16
Payer: COMMERCIAL

## 2022-12-16 LAB
CHEMOTHERAPY INFUSION START DATE: NORMAL
CHEMOTHERAPY RECORDS: 1.8
CHEMOTHERAPY RECORDS: 1980
CHEMOTHERAPY RECORDS: NORMAL
CHEMOTHERAPY RX CANCER: NORMAL
DATE 1ST CHEMO CANCER: NORMAL
RAD ONC ARIA COURSE LAST TREATMENT DATE: NORMAL
RAD ONC ARIA COURSE TREATMENT ELAPSED DAYS: NORMAL
RAD ONC ARIA REFERENCE POINT DOSAGE GIVEN TO DATE: 10.8
RAD ONC ARIA REFERENCE POINT DOSAGE GIVEN TO DATE: 11.42
RAD ONC ARIA REFERENCE POINT ID: NORMAL
RAD ONC ARIA REFERENCE POINT ID: NORMAL
RAD ONC ARIA REFERENCE POINT SESSION DOSAGE GIVEN: 1.8
RAD ONC ARIA REFERENCE POINT SESSION DOSAGE GIVEN: 1.9

## 2022-12-16 PROCEDURE — 77014 PR CT GUIDANCE PLACEMENT RAD THERAPY FIELDS: CPT | Mod: 26 | Performed by: RADIOLOGY

## 2022-12-16 PROCEDURE — 77385 HCHG IMRT DELIVERY SIMPLE: CPT | Performed by: RADIOLOGY

## 2022-12-19 ENCOUNTER — HOSPITAL ENCOUNTER (OUTPATIENT)
Dept: RADIATION ONCOLOGY | Facility: MEDICAL CENTER | Age: 67
End: 2022-12-19
Payer: COMMERCIAL

## 2022-12-19 LAB
CHEMOTHERAPY INFUSION START DATE: NORMAL
CHEMOTHERAPY RECORDS: 1.8
CHEMOTHERAPY RECORDS: 1980
CHEMOTHERAPY RECORDS: NORMAL
CHEMOTHERAPY RX CANCER: NORMAL
DATE 1ST CHEMO CANCER: NORMAL
RAD ONC ARIA COURSE LAST TREATMENT DATE: NORMAL
RAD ONC ARIA COURSE TREATMENT ELAPSED DAYS: NORMAL
RAD ONC ARIA REFERENCE POINT DOSAGE GIVEN TO DATE: 12.6
RAD ONC ARIA REFERENCE POINT DOSAGE GIVEN TO DATE: 13.32
RAD ONC ARIA REFERENCE POINT ID: NORMAL
RAD ONC ARIA REFERENCE POINT ID: NORMAL
RAD ONC ARIA REFERENCE POINT SESSION DOSAGE GIVEN: 1.8
RAD ONC ARIA REFERENCE POINT SESSION DOSAGE GIVEN: 1.9

## 2022-12-19 PROCEDURE — 77014 PR CT GUIDANCE PLACEMENT RAD THERAPY FIELDS: CPT | Mod: 26 | Performed by: RADIOLOGY

## 2022-12-19 PROCEDURE — 77385 HCHG IMRT DELIVERY SIMPLE: CPT | Performed by: RADIOLOGY

## 2022-12-19 PROCEDURE — 77427 RADIATION TX MANAGEMENT X5: CPT | Performed by: RADIOLOGY

## 2022-12-20 ENCOUNTER — HOSPITAL ENCOUNTER (OUTPATIENT)
Dept: RADIATION ONCOLOGY | Facility: MEDICAL CENTER | Age: 67
End: 2022-12-20
Payer: COMMERCIAL

## 2022-12-20 LAB
CHEMOTHERAPY INFUSION START DATE: NORMAL
CHEMOTHERAPY RECORDS: 1.8
CHEMOTHERAPY RECORDS: 1980
CHEMOTHERAPY RECORDS: NORMAL
CHEMOTHERAPY RX CANCER: NORMAL
DATE 1ST CHEMO CANCER: NORMAL
RAD ONC ARIA COURSE LAST TREATMENT DATE: NORMAL
RAD ONC ARIA COURSE TREATMENT ELAPSED DAYS: NORMAL
RAD ONC ARIA REFERENCE POINT DOSAGE GIVEN TO DATE: 14.4
RAD ONC ARIA REFERENCE POINT DOSAGE GIVEN TO DATE: 15.22
RAD ONC ARIA REFERENCE POINT ID: NORMAL
RAD ONC ARIA REFERENCE POINT ID: NORMAL
RAD ONC ARIA REFERENCE POINT SESSION DOSAGE GIVEN: 1.8
RAD ONC ARIA REFERENCE POINT SESSION DOSAGE GIVEN: 1.9

## 2022-12-20 PROCEDURE — 77385 HCHG IMRT DELIVERY SIMPLE: CPT | Performed by: RADIOLOGY

## 2022-12-20 PROCEDURE — 77014 PR CT GUIDANCE PLACEMENT RAD THERAPY FIELDS: CPT | Mod: 26 | Performed by: RADIOLOGY

## 2022-12-21 ENCOUNTER — HOSPITAL ENCOUNTER (OUTPATIENT)
Dept: RADIATION ONCOLOGY | Facility: MEDICAL CENTER | Age: 67
End: 2022-12-21
Payer: COMMERCIAL

## 2022-12-21 VITALS
DIASTOLIC BLOOD PRESSURE: 69 MMHG | HEART RATE: 72 BPM | BODY MASS INDEX: 21.43 KG/M2 | SYSTOLIC BLOOD PRESSURE: 110 MMHG | OXYGEN SATURATION: 98 % | WEIGHT: 153.66 LBS

## 2022-12-21 LAB
CHEMOTHERAPY INFUSION START DATE: NORMAL
CHEMOTHERAPY RECORDS: 1.8
CHEMOTHERAPY RECORDS: 1980
CHEMOTHERAPY RECORDS: NORMAL
CHEMOTHERAPY RX CANCER: NORMAL
DATE 1ST CHEMO CANCER: NORMAL
RAD ONC ARIA COURSE LAST TREATMENT DATE: NORMAL
RAD ONC ARIA COURSE TREATMENT ELAPSED DAYS: NORMAL
RAD ONC ARIA REFERENCE POINT DOSAGE GIVEN TO DATE: 16.2
RAD ONC ARIA REFERENCE POINT DOSAGE GIVEN TO DATE: 17.13
RAD ONC ARIA REFERENCE POINT ID: NORMAL
RAD ONC ARIA REFERENCE POINT ID: NORMAL
RAD ONC ARIA REFERENCE POINT SESSION DOSAGE GIVEN: 1.8
RAD ONC ARIA REFERENCE POINT SESSION DOSAGE GIVEN: 1.9

## 2022-12-21 PROCEDURE — 77385 HCHG IMRT DELIVERY SIMPLE: CPT | Performed by: RADIOLOGY

## 2022-12-21 PROCEDURE — 77014 PR CT GUIDANCE PLACEMENT RAD THERAPY FIELDS: CPT | Mod: 26 | Performed by: RADIOLOGY

## 2022-12-21 ASSESSMENT — PAIN SCALES - GENERAL: PAINLEVEL: NO PAIN

## 2022-12-21 NOTE — ON TREATMENT VISIT
ON TREATMENT NOTE  RADIATION ONCOLOGY DEPARTMENT    Patient name:  Surya Major    Primary Physician:  Pcp Pt States None MRN: [id number]  CSN: [id number]   Referring physician:  No ref. provider found : 1955, 67 y.o.     ENCOUNTER DATE:  22    DIAGNOSIS:    Prostate cancer (HCC)  Staging form: Prostate, AJCC 8th Edition  - Pathologic: Stage IIB (pT2, pN0, cM0, PSA: 12, Grade Group: 2) - Signed by Jarret Smith M.D. on 9/15/2022  Prostate specific antigen (PSA) range: 10 to 19  [place] score: 7  Histologic grading system: 5 grade system      TREATMENT SUMMARY:  Aria Treatment Information          2022   Aria Course Treatment Dates   Course First Treatment Date 10/31/2022     Course Last Treatment Date 2022     Aria Treatment Summary   Prostate Bst  Plan from Course C1_Pelvis   Fraction 9 of 11   Elapsed Course Days 51 @    Prescribed Fraction Dose 180 cGy   Prescribed Total Dose 1,980 cGy   Prostate_Bst  Reference Point from Course C1_Pelvis   Elapsed Course Days 51 @ [id number]   Session Dose 180 cGy   Total Dose 1,620 cGy   Prostate_Bst_CP  Reference Point from Course C1_Pelvis   Elapsed Course Days 51 @    Session Dose 190 cGy   Total Dose 1,713 cGy      Radiation Treatments       Active   Plans   Prostate   Most recent treatment: Dose planned: 180 cGy (fraction 28 of 28 on 2022)   Total: Dose planned: 5,040 cGy   Elapsed Days: 38 @       Prostate Bst   Most recent treatment: Dose planned: 180 cGy (fraction 9 of 11 on 2022)   Total: Dose planned: 1,980 cGy   Elapsed Days: 51 @            Historical   No historical radiation treatments to show.               SUBJECTIVE:   Patient is doing well.  He does not have any significant urinary or bowel symptoms.  He has a little sensitivity around the pelvic floor musculature.  He states he notices this as a soreness deep to the perineum.    VITAL SIGNS:    Encounter  Vitals  Blood Pressure : 110/69  Pulse: 72  Pulse Oximetry: 98 %  Weight: 69.7 kg (153 lb 10.6 oz)  Pain Score: No pain      12/21/2022 12/14/2022 12/7/2022 11/30/2022 11/22/2022 11/16/2022   Pain Assessment   Pain Score NO PAIN NO PAIN NO PAIN NO PAIN NO PAIN NO PAIN       Multiple values from one day are sorted in reverse-chronological order          PHYSICAL EXAM:  Physical Exam  Genitourinary:     Comments: Normal       TOXICITY      12/21/2022 12/14/2022 12/7/2022 11/30/2022 11/22/2022 11/16/2022 11/9/2022   Toxicity Assessment   Toxicity Assessment Male Pelvis Male Pelvis Male Pelvis Male Pelvis Male Pelvis Male Pelvis Male Pelvis   Fatigue (lethargy, malaise, asthenia) None None Increased fatigue over baseline, but not altering normal activities None None Increased fatigue over baseline, but not altering normal activities Increased fatigue over baseline, but not altering normal activities   Radiation Dermatitis None Faint erythema or dry desquamation None None None None None   Anorexia None None None None None Loss of appetite None   Colitis None None None None None None None   Constipation None None None None None None None   Dehydration None None None None None None None   Diarrhea w/o Colostomy None None None None Increase of <4 stools/day over pre-treatment Increase of <4 stools/day over pre-treatment None   Flatulence None None Mild None None None Mild   Nausea None Able to eat Able to eat None None None None   Proctitis None None None None None None None   Vomiting None None None None None None None   RT - Pain due to RT None None None None None None None   Tumor Pain (onset or exacerbation of tumor pain due to treatment) None None None None None None None   Dysuria (painful urination) None None None None None None Mild symptoms requiring no intervention   Urinary Frequency Normal Normal Normal Normal Normal Normal Normal   Urinary Urgency None None None None None None None   Bladder Spasms Absent  Absent Absent Absent Absent Absent Absent   Incontinence None None None None None None None   Urinary Retention Normal Normal Normal Normal Normal Normal Normal       Multiple values from one day are sorted in reverse-chronological order         IMPRESSION:  Cancer Staging   Prostate cancer (HCC)  Staging form: Prostate, AJCC 8th Edition  - Clinical: No stage assigned - Unsigned  - Pathologic: Stage IIB (pT2, pN0, cM0, PSA: 12, Grade Group: 2) - Signed by Jarret Smith M.D. on 9/15/2022      PLAN:  No change in treatment plan    Disposition:  Treatment plan reviewed. Questions answered. Continue therapy outlined.     Jarret Smtih M.D.    No orders of the defined types were placed in this encounter.

## 2022-12-22 ENCOUNTER — HOSPITAL ENCOUNTER (OUTPATIENT)
Dept: RADIATION ONCOLOGY | Facility: MEDICAL CENTER | Age: 67
End: 2022-12-22
Payer: COMMERCIAL

## 2022-12-22 LAB
CHEMOTHERAPY INFUSION START DATE: NORMAL
CHEMOTHERAPY RECORDS: 1.8
CHEMOTHERAPY RECORDS: 1980
CHEMOTHERAPY RECORDS: NORMAL
CHEMOTHERAPY RX CANCER: NORMAL
DATE 1ST CHEMO CANCER: NORMAL
RAD ONC ARIA COURSE LAST TREATMENT DATE: NORMAL
RAD ONC ARIA COURSE TREATMENT ELAPSED DAYS: NORMAL
RAD ONC ARIA REFERENCE POINT DOSAGE GIVEN TO DATE: 18
RAD ONC ARIA REFERENCE POINT DOSAGE GIVEN TO DATE: 19.03
RAD ONC ARIA REFERENCE POINT ID: NORMAL
RAD ONC ARIA REFERENCE POINT ID: NORMAL
RAD ONC ARIA REFERENCE POINT SESSION DOSAGE GIVEN: 1.8
RAD ONC ARIA REFERENCE POINT SESSION DOSAGE GIVEN: 1.9

## 2022-12-22 PROCEDURE — 77014 PR CT GUIDANCE PLACEMENT RAD THERAPY FIELDS: CPT | Mod: 26 | Performed by: RADIOLOGY

## 2022-12-22 PROCEDURE — 77385 HCHG IMRT DELIVERY SIMPLE: CPT | Performed by: RADIOLOGY

## 2022-12-22 PROCEDURE — 77336 RADIATION PHYSICS CONSULT: CPT | Performed by: RADIOLOGY

## 2022-12-23 ENCOUNTER — HOSPITAL ENCOUNTER (OUTPATIENT)
Dept: RADIATION ONCOLOGY | Facility: MEDICAL CENTER | Age: 67
End: 2022-12-23

## 2022-12-23 LAB
CHEMOTHERAPY INFUSION START DATE: NORMAL
CHEMOTHERAPY INFUSION START DATE: NORMAL
CHEMOTHERAPY INFUSION STOP DATE: NORMAL
CHEMOTHERAPY RECORDS: 1.8
CHEMOTHERAPY RECORDS: 1980
CHEMOTHERAPY RECORDS: 1980
CHEMOTHERAPY RECORDS: 5040
CHEMOTHERAPY RECORDS: NORMAL
CHEMOTHERAPY RX CANCER: NORMAL
CHEMOTHERAPY RX CANCER: NORMAL
DATE 1ST CHEMO CANCER: NORMAL
DATE 1ST CHEMO CANCER: NORMAL
RAD ONC ARIA COURSE LAST TREATMENT DATE: NORMAL
RAD ONC ARIA COURSE LAST TREATMENT DATE: NORMAL
RAD ONC ARIA COURSE TREATMENT ELAPSED DAYS: NORMAL
RAD ONC ARIA COURSE TREATMENT ELAPSED DAYS: NORMAL
RAD ONC ARIA REFERENCE POINT DOSAGE GIVEN TO DATE: 19.8
RAD ONC ARIA REFERENCE POINT DOSAGE GIVEN TO DATE: 19.8
RAD ONC ARIA REFERENCE POINT DOSAGE GIVEN TO DATE: 20.93
RAD ONC ARIA REFERENCE POINT DOSAGE GIVEN TO DATE: 20.93
RAD ONC ARIA REFERENCE POINT DOSAGE GIVEN TO DATE: 50.4
RAD ONC ARIA REFERENCE POINT DOSAGE GIVEN TO DATE: 51.87
RAD ONC ARIA REFERENCE POINT ID: NORMAL
RAD ONC ARIA REFERENCE POINT SESSION DOSAGE GIVEN: 1.8
RAD ONC ARIA REFERENCE POINT SESSION DOSAGE GIVEN: 1.9

## 2022-12-23 PROCEDURE — 77385 HCHG IMRT DELIVERY SIMPLE: CPT | Performed by: RADIOLOGY

## 2022-12-23 PROCEDURE — 77014 PR CT GUIDANCE PLACEMENT RAD THERAPY FIELDS: CPT | Mod: 26 | Performed by: RADIOLOGY

## 2022-12-23 PROCEDURE — 77427 RADIATION TX MANAGEMENT X5: CPT | Performed by: RADIOLOGY

## 2022-12-28 NOTE — RADIATION COMPLETION NOTES
END OF TREATMENT SUMMARY    Patient name:  Surya Major    Primary Physician:  Pcp Pt States None MRN: [id number]  CSN: [id number]   Referring physician:  Dr. Curtis SANDOVALB: 1955, 67 y.o.       TREATMENT SUMMARY:        Course First Treatment Date 10/31/2022    Course Last Treatment Date 2022   Course Elapsed Days 53 @ [id number]   Course Intent Curative     Radiation Therapy Episodes       Active Episodes       Radiation Therapy: IMRT                   Radiation Treatments         Plan Last Treated On Elapsed Days Fractions Treated Prescribed Fraction Dose (cGy) Prescribed Total Dose (cGy)    Prostate 2022 53 @  28 of  180 5,040    Prostate Bst 2022 53 @ [id number] 11 of 11 180 1,980                  Reference Point Last Treated On Elapsed Days Most Recent Session Dose (cGy) Total Dose (cGy)    Prostate 2022 53 @  -- 5,040    Prostate_Bst 2022 53 @  -- 1,980                                     STAGE:   Prostate cancer (HCC)  Staging form: Prostate, AJCC 8th Edition  - Pathologic: Stage IIB (pT2, pN0, cM0, PSA: 12, Grade Group: 2) - Signed by Jarret Smith M.D. on 9/15/2022  Prostate specific antigen (PSA) range: 10 to 19  Raymond score: 7  Histologic grading system: 5 grade system       TREATMENT INDICATION:   Rising PSA in the post prostatectomy setting     CONCURRENT SYSTEMIC TREATMENT:   Androgen deprivation therapy     RT COURSE DISCONTINUED EARLY:   No     PATIENT EXPERIENCE:       2022   Toxicity Assessment   Toxicity Assessment Male Pelvis Male Pelvis Male Pelvis Male Pelvis Male Pelvis Male Pelvis Male Pelvis   Fatigue (lethargy, malaise, asthenia) None None Increased fatigue over baseline, but not altering normal activities None None Increased fatigue over baseline, but not altering normal activities Increased fatigue over baseline, but not altering  normal activities   Radiation Dermatitis None Faint erythema or dry desquamation None None None None None   Anorexia None None None None None Loss of appetite None   Colitis None None None None None None None   Constipation None None None None None None None   Dehydration None None None None None None None   Diarrhea w/o Colostomy None None None None Increase of <4 stools/day over pre-treatment Increase of <4 stools/day over pre-treatment None   Flatulence None None Mild None None None Mild   Nausea None Able to eat Able to eat None None None None   Proctitis None None None None None None None   Vomiting None None None None None None None   RT - Pain due to RT None None None None None None None   Tumor Pain (onset or exacerbation of tumor pain due to treatment) None None None None None None None   Dysuria (painful urination) None None None None None None Mild symptoms requiring no intervention   Urinary Frequency Normal Normal Normal Normal Normal Normal Normal   Urinary Urgency None None None None None None None   Bladder Spasms Absent Absent Absent Absent Absent Absent Absent   Incontinence None None None None None None None   Urinary Retention Normal Normal Normal Normal Normal Normal Normal       Multiple values from one day are sorted in reverse-chronological order        FOLLOW-UP PLAN:   6 Weeks     COMMENT:          ANATOMIC TARGET SUMMARY    ANATOMIC TARGET MODALITY TECHNIQUE   Prostate bed and pelvis   External beam, photons IMRT            COMMENT:         DIAGRAMS:      DOSE VOLUME HISTOGRAMS:

## 2023-02-27 ENCOUNTER — HOSPITAL ENCOUNTER (OUTPATIENT)
Dept: RADIATION ONCOLOGY | Facility: MEDICAL CENTER | Age: 68
End: 2023-02-28
Attending: RADIOLOGY
Payer: COMMERCIAL

## 2023-02-27 NOTE — PROGRESS NOTES
Telephone Appointment Visit   This telephone visit was initiated by the patient and they verbally consented.    Reason for Call:  Symptom Follow-up    HPI:    Adenocarcinoma the prostate, presenting PSA 12, post prostatectomy PSA 0.01, current PSA 0.15, Raymond score on final surgical specimen 3+4 equal 7, pathologic T2 with positive surgical margin, status post radical prostatectomy with lymph node dissection in August 2019, completing prostate fossa and pelvic radiation in December 2022, 5040 centigrade to the lymph nodes with a boost to 7020 cGy to the prostate fossa    Patient states he has returned to his baseline.  He did not have any significant change after completing his radiation.  He is scheduled to be seen at the [facility] for PSA on March 22.  He is otherwise been in his usual health.    Labs / Images Reviewed:   none     Assessment and Plan:     [facility]  However should they have any questions or concerns at any time I would be happy to reengage in his care.    Follow-up: as needed    Total Time Spent (mins): 15    Jarret Smith M.D.

## 2023-10-06 ENCOUNTER — TELEPHONE (OUTPATIENT)
Dept: HEALTH INFORMATION MANAGEMENT | Facility: OTHER | Age: 68
End: 2023-10-06
Payer: COMMERCIAL

## 2025-03-04 ENCOUNTER — HOSPITAL ENCOUNTER (OUTPATIENT)
Dept: RADIOLOGY | Facility: MEDICAL CENTER | Age: 70
End: 2025-03-04
Attending: FAMILY MEDICINE
Payer: COMMERCIAL

## 2025-03-04 DIAGNOSIS — N63.10 MASS OF RIGHT BREAST, UNSPECIFIED QUADRANT: ICD-10-CM

## 2025-03-04 PROCEDURE — 76642 ULTRASOUND BREAST LIMITED: CPT | Mod: RT
